# Patient Record
Sex: FEMALE | Race: WHITE | NOT HISPANIC OR LATINO | Employment: UNEMPLOYED | URBAN - METROPOLITAN AREA
[De-identification: names, ages, dates, MRNs, and addresses within clinical notes are randomized per-mention and may not be internally consistent; named-entity substitution may affect disease eponyms.]

---

## 2017-01-20 ENCOUNTER — TELEPHONE (OUTPATIENT)
Dept: MATERNAL FETAL MEDICINE | Facility: CLINIC | Age: 32
End: 2017-01-20

## 2017-01-20 ENCOUNTER — TELEPHONE (OUTPATIENT)
Dept: OBSTETRICS AND GYNECOLOGY | Facility: CLINIC | Age: 32
End: 2017-01-20

## 2017-01-20 DIAGNOSIS — Z36.89 ESTABLISH GESTATIONAL AGE, ULTRASOUND: Primary | ICD-10-CM

## 2017-01-20 DIAGNOSIS — O09.30 LATE PRENATAL CARE AFFECTING PREGNANCY: ICD-10-CM

## 2017-01-20 DIAGNOSIS — Z36.89 ENCOUNTER FOR FETAL ANATOMIC SURVEY: ICD-10-CM

## 2017-01-20 DIAGNOSIS — O09.32 LIMITED PRENATAL CARE IN SECOND TRIMESTER: ICD-10-CM

## 2017-01-20 NOTE — TELEPHONE ENCOUNTER
"Patient calling [facility] today to schedule her ultrasound. After some questioning and looking at her record, it does appear that patient has had very limited prenatal care. There was no dating ultrasound noted. Patient was last seen in September of 2016.    I asked patient if she was feeling fetal movement - per patient - "Yes I am feeling baby move. A lot."    Patient is scheduled to see Alejandra Fisher NP on 01/24/2017. Nurse confirmed that with her over the phone and then scheduled her for the next available appointment on Tuesday 02/07/2017 in [facility] for a Dating/Anatomy Scan.     She confirmed that the number listed in EPIC is her best contact number. Per patient, "I never had any missed calls from [facility] to schedule."    Message sent to Dr. Fuchs and Alejandra to keep all health care providers updated.    Patient verbalized understanding of all information received.  "

## 2017-01-20 NOTE — TELEPHONE ENCOUNTER
Thanks Mago for letting us know. Pt. No showed her appointments back in October and we have tried to call patient to get her rescheduled but was unable to get in touch with pt. Thanks for letting us know

## 2017-01-20 NOTE — TELEPHONE ENCOUNTER
"----- Message from Mago Garnica RN sent at 1/20/2017 11:13 AM CST -----  Hey Arlette Snyder and Ms. Alejandra Landry calling [facility] today to schedule her ultrasound. After some questioning and looking at her record, it does appear that patient has had very limited prenatal care. I did not even see a dating ultrasound.    I asked patient if she was feeling fetal movement - per patient - "Yes I am feeling baby move. A lot."    I see that she is scheduled to see Alejandra on 01/24/2017. I confirmed that with her over the phone and then scheduled her for our next available scan on Tuesday 02/07/2017 in [facility] for a Dating/Anatomy Scan.     I hope this is okay. She confirmed that the number listed in EPIC is her best contact number. Per patient, "I never had any missed calls from [facility] to schedule."    Anyway, just wanted to update you all and get everyone on the same page.    Thanks,  Mago Garnica, BSN, RN  [facility]    "

## 2017-01-24 ENCOUNTER — ROUTINE PRENATAL (OUTPATIENT)
Dept: OBSTETRICS AND GYNECOLOGY | Facility: CLINIC | Age: 32
End: 2017-01-24
Payer: MEDICAID

## 2017-01-24 VITALS
WEIGHT: 167.56 LBS | SYSTOLIC BLOOD PRESSURE: 100 MMHG | DIASTOLIC BLOOD PRESSURE: 72 MMHG | BODY MASS INDEX: 23.37 KG/M2

## 2017-01-24 DIAGNOSIS — R30.0 DYSURIA DURING PREGNANCY, THIRD TRIMESTER: ICD-10-CM

## 2017-01-24 DIAGNOSIS — O09.33 LIMITED PRENATAL CARE, THIRD TRIMESTER: ICD-10-CM

## 2017-01-24 DIAGNOSIS — O26.893 DYSURIA DURING PREGNANCY, THIRD TRIMESTER: ICD-10-CM

## 2017-01-24 DIAGNOSIS — Z34.80 SUPERVISION OF OTHER NORMAL PREGNANCY: Primary | ICD-10-CM

## 2017-01-24 LAB
AMPHET+METHAMPHET UR QL: NEGATIVE
BARBITURATES UR QL SCN>200 NG/ML: NEGATIVE
BENZODIAZ UR QL SCN>200 NG/ML: NEGATIVE
BZE UR QL SCN: NEGATIVE
CANNABINOIDS UR QL SCN: NORMAL
CREAT UR-MCNC: 85 MG/DL
ETHANOL UR-MCNC: <10 MG/DL
METHADONE UR QL SCN>300 NG/ML: NEGATIVE
OPIATES UR QL SCN: NEGATIVE
PCP UR QL SCN>25 NG/ML: NEGATIVE
TOXICOLOGY INFORMATION: NORMAL

## 2017-01-24 PROCEDURE — 87086 URINE CULTURE/COLONY COUNT: CPT

## 2017-01-24 PROCEDURE — 99999 PR PBB SHADOW E&M-EST. PATIENT-LVL III: CPT | Mod: PBBFAC,,, | Performed by: NURSE PRACTITIONER

## 2017-01-24 PROCEDURE — 99213 OFFICE O/P EST LOW 20 MIN: CPT | Mod: PBBFAC | Performed by: NURSE PRACTITIONER

## 2017-01-24 PROCEDURE — 87077 CULTURE AEROBIC IDENTIFY: CPT

## 2017-01-24 PROCEDURE — 87186 SC STD MICRODIL/AGAR DIL: CPT

## 2017-01-24 PROCEDURE — 80307 DRUG TEST PRSMV CHEM ANLYZR: CPT

## 2017-01-24 PROCEDURE — 99212 OFFICE O/P EST SF 10 MIN: CPT | Mod: TH,S$PBB,, | Performed by: NURSE PRACTITIONER

## 2017-01-24 PROCEDURE — 87088 URINE BACTERIA CULTURE: CPT

## 2017-01-24 RX ORDER — NITROFURANTOIN 25; 75 MG/1; MG/1
100 CAPSULE ORAL 2 TIMES DAILY
Qty: 14 CAPSULE | Refills: 0 | Status: SHIPPED | OUTPATIENT
Start: 2017-01-24 | End: 2017-01-31

## 2017-01-24 NOTE — PROGRESS NOTES
Here for routine OB appt at 29w5d.  This is her 2nd PN visit this pregnancy.  She has not yet had a dating US or anatomy scan.  Pt states she has not been seen because she was contemplating .  She is now considering adoption.  Reports she is down to 3 cigarettes per day.  Reports she is off Adderrall.   Per [facility] notes from family medicine doctor in 10/2016 and 2016 she reported having a miscarriage and was prescribed Adderall at both visits.   Pt is c/o dysuria, frequency, and back pain.  Denies fever.  Udip + Nitrates.  Urine culture pending.  Macrobid prescribed.  Reports good FM.  Denies LOF, denies VB, denies contractions.  Reviewed warning signs of Labor and Preeclampsia.  Daily FM counts reinforced.  Utox, OB glucose and CBC today.  Consult to  gem Nicole notified.  MFM scan scheduled for 17  F/U scheduled 2 weeks

## 2017-01-24 NOTE — MR AVS SNAPSHOT
[facility] [address]  [address]  [place] [address]  Phone: [phone]  Fax: [phone]                  Greer Landry   2017 9:00 AM   Routine Prenatal    Description:  Female : 1985   Provider:  Alejandra Fisher NP   Department:  [facility] [address]           Reason for Visit     Routine Prenatal Visit                To Do List           Future Appointments        Provider Department Dept Phone    2017 8:40 AM ULTRASOUND, [facility] 4TH FLR CLINIC [facility] [phone]      Goals (5 Years of Data)     None      [facility]     [facility] Line -  Assistance  Registered nurses in the [facility] provide clinical advisement, health education, appointment booking, and other advisory services.  Call for this free service at [phone].             Medications           Message regarding Medications     Verify the changes and/or additions to your medication regime listed below are the same as discussed with your clinician today.  If any of these changes or additions are incorrect, please notify your healthcare provider.             Verify that the below list of medications is an accurate representation of the medications you are currently taking.  If none reported, the list may be blank. If incorrect, please contact your healthcare provider. Carry this list with you in case of emergency.           Current Medications     dextroamphetamine-amphetamine (ADDERALL) 20 mg tablet Take 1 tablet by mouth 2 (two) times daily.    dextroamphetamine-amphetamine (ADDERALL) 20 mg tablet Take 1 tablet by mouth 2 (two) times daily.    ondansetron (ZOFRAN, AS HYDROCHLORIDE,) 8 MG tablet Take 1 tablet (8 mg total) by mouth every 12 (twelve) hours as needed for Nausea.    PNV no.106-iron-folate #6-dha (OB COMPLETE GOLD) 27.5 mg iron- 1 mg Cap Take 27.5 mg by mouth once daily.           Clinical Reference Information            Prenatal Vitals     Enc. Date GA Prenatal Vitals Prenatal Pulse Pain Level Urine Albumin/Glucose Edema Presentation Dilation/Effacement/Station    1/24/17 29w5d 100/72 / 76 kg (167 lb 8.8 oz)   0 Trace / Negative       9/23/16 12w1d 102/64 / 71.1 kg (156 lb 12 oz)   0 Negative / Negative         Vital Signs - Last Recorded  Most recent update: 1/24/2017  9:28 AM by Jessenia Palacios MA    BP Wt LMP BMI       100/72 76 kg (167 lb 8.8 oz) 06/30/2016 23.37 kg/m2       Allergies as of 1/24/2017     No Known Allergies      Immunizations Administered on Date of Encounter - 1/24/2017     None      Smoking Cessation     If you would like to quit smoking:   You may be eligible for free services if you are a [place] resident and started smoking cigarettes before September 1, 1988.  Call the Smoking Cessation Trust (SCT) toll free at (024) 839-3784 or (325) 092-6903.   Call 1-286-QUIT-NOW if you do not meet the above criteria.

## 2017-01-25 ENCOUNTER — TELEPHONE (OUTPATIENT)
Dept: OBSTETRICS AND GYNECOLOGY | Facility: CLINIC | Age: 32
End: 2017-01-25

## 2017-01-25 NOTE — TELEPHONE ENCOUNTER
Called patient and notified her that her urine was positive for THC and that she needed to remain abstinent of drug use during pregnancy.

## 2017-01-26 LAB — BACTERIA UR CULT: NORMAL

## 2017-02-07 ENCOUNTER — SOCIAL WORK (OUTPATIENT)
Dept: CASE MANAGEMENT | Facility: OTHER | Age: 32
End: 2017-02-07
Payer: MEDICAID

## 2017-02-07 ENCOUNTER — OFFICE VISIT (OUTPATIENT)
Dept: MATERNAL FETAL MEDICINE | Facility: CLINIC | Age: 32
End: 2017-02-07
Attending: OBSTETRICS & GYNECOLOGY
Payer: MEDICAID

## 2017-02-07 ENCOUNTER — ROUTINE PRENATAL (OUTPATIENT)
Dept: OBSTETRICS AND GYNECOLOGY | Facility: CLINIC | Age: 32
End: 2017-02-07
Payer: MEDICAID

## 2017-02-07 VITALS — BODY MASS INDEX: 23.15 KG/M2 | DIASTOLIC BLOOD PRESSURE: 70 MMHG | SYSTOLIC BLOOD PRESSURE: 100 MMHG | WEIGHT: 166 LBS

## 2017-02-07 DIAGNOSIS — Z36.89 ESTABLISH GESTATIONAL AGE, ULTRASOUND: ICD-10-CM

## 2017-02-07 DIAGNOSIS — O09.32 LIMITED PRENATAL CARE IN SECOND TRIMESTER: ICD-10-CM

## 2017-02-07 DIAGNOSIS — Z36.89 ENCOUNTER FOR FETAL ANATOMIC SURVEY: ICD-10-CM

## 2017-02-07 DIAGNOSIS — O09.33 LIMITED PRENATAL CARE IN THIRD TRIMESTER: Primary | ICD-10-CM

## 2017-02-07 DIAGNOSIS — G43.909 MIGRAINE WITHOUT STATUS MIGRAINOSUS, NOT INTRACTABLE, UNSPECIFIED MIGRAINE TYPE: ICD-10-CM

## 2017-02-07 DIAGNOSIS — F98.8 ADD (ATTENTION DEFICIT DISORDER): ICD-10-CM

## 2017-02-07 DIAGNOSIS — Z34.93 NORMAL PREGNANCY IN THIRD TRIMESTER: ICD-10-CM

## 2017-02-07 PROCEDURE — 99212 OFFICE O/P EST SF 10 MIN: CPT | Mod: PBBFAC,25 | Performed by: OBSTETRICS & GYNECOLOGY

## 2017-02-07 PROCEDURE — 99213 OFFICE O/P EST LOW 20 MIN: CPT | Mod: TH,S$PBB,, | Performed by: OBSTETRICS & GYNECOLOGY

## 2017-02-07 PROCEDURE — 99499 UNLISTED E&M SERVICE: CPT | Mod: S$PBB,,, | Performed by: PEDIATRICS

## 2017-02-07 PROCEDURE — 76811 OB US DETAILED SNGL FETUS: CPT | Mod: 26,S$PBB,, | Performed by: PEDIATRICS

## 2017-02-07 PROCEDURE — 99999 PR PBB SHADOW E&M-EST. PATIENT-LVL II: CPT | Mod: PBBFAC,,, | Performed by: OBSTETRICS & GYNECOLOGY

## 2017-02-07 RX ORDER — BUTALBITAL, ACETAMINOPHEN AND CAFFEINE 50; 325; 40 MG/1; MG/1; MG/1
1 TABLET ORAL EVERY 4 HOURS PRN
Qty: 30 TABLET | Refills: 0 | Status: SHIPPED | OUTPATIENT
Start: 2017-02-07 | End: 2017-03-09 | Stop reason: SDUPTHER

## 2017-02-07 NOTE — LETTER
February 7, 2017      Lila Fuchs MD  [address]  [address]  [place] [address]           [facility]  [address]  [place] [address]  Phone: [phone]          Patient: Greer Landry   MR Number: [id number]   YOB: 1985   Date of Visit: 2/7/2017       Dear Dr. Lila Fuchs:    Thank you for referring Greer Landry to me for evaluation. Attached you will find relevant portions of my assessment and plan of care.    If you have questions, please do not hesitate to call me. I look forward to following Greer Landry along with you.    Sincerely,    Michelle Camacho MD    Enclosure  CC:  No Recipients    If you would like to receive this communication electronically, please contact [email or web address] or [phone] to request more information on [facility] access.    For providers and/or their staff who would like to refer a patient to [facility], please contact us through our one-stop-shop provider referral line, [facility], at [phone].    If you feel you have received this communication in error or would no longer like to receive these types of communications, please e-mail [email or web address]

## 2017-02-07 NOTE — PROGRESS NOTES
Positive fetal movement.  No contractions, vaginal bleeding, or loss of fluid.    Late transfer of care.  Patient's partner is in retirement and she is unsure if she plans on keeping the baby or putting up for adoption.  Will meet with Tammy today.    Migraines-- patient reports using an entire bottle of extra strength tylenol.  Will prescribe fioricet today.  TDAP and glucose screen today.   Defer measurement and FHT as patient had U/S today.

## 2017-02-08 NOTE — PROGRESS NOTES
Indication: dating/ anatomy (Dr. GOLDIE YEUNG).   Maternal age (31 years).   No screenings.   ____________________________________________________________________________  History: Age: 31 years. LMP not sure.  ____________________________________________________________________________  Dating:  LMP: 06/30/16 EDC: 04/06/17 GA by LMP: 31w5d  Current Scan on: 02/07/17 EDC: 03/18/17 GA by current scan: 34w3d  Best Overall Assessment: 02/07/17 EDC: 03/18/17 Assessed GA: 34w3d  The calculation of the gestational age by current scan was based on BPD, OFD, HC, AC, FL and HUM.  The Best Overall Assessment is based on the ultrasound examination on 02/07/17.  ____________________________________________________________________________  General Evaluation:  Fetal heart activity: present. Fetal heart rate: 140 bpm.   Presentation: cephalic.   Fetal movement: visible.   Amniotic Fluid: normal. Maximal vertical pocket 5.9 cm.   Cord: 3 vessels.   Placenta: anterior.     ____________________________________________________________________________  Anatomy Scan:    Reyes gestation.    Biometry:  BPD 86.2 mm 60th% 34w5d (33w5d to 35w6d)  .6 mm 36th% 35w2d (32w2d to 38w2d)  .4 mm >95th% 36w3d (35w3d to 37w3d)  FL 63.3 mm 8th% 32w5d (29w5d to 35w5d)  .3 mm 48th% 34w2d  HUM 56.7 mm 28th% 33w2d  CM 2.6 mm <5th%  EFW (lbs/oz) 5 lbs 12 ozs  EFW (g) 2619 g  67th%       Fetal Anatomy:    Visualized with normal appearance: head, chest, gastrointestinal tract, kidneys, bladder.  Not examined: neck, skin.  Brain: sub-opt due to position and gestational age.  Face: profile sub-opt, nose normal, lip normal.  Spine: Sub-optimal  Heart: Visualized and normal appearance: four-chamber view, right outflow tract, inferior vena cava, superior vena cava. Not visible: Ductal arch.   Angle: to the fetal left. Four-chamber view: septum appears intact. Left outflow tract: sub-opt. Aortic arch: Normal.  Abdominal Wall:  Sub-optimal.   Genitalia: Female fetus.   Extremities: 4 limbs. Left plantar foot subopt, rt plantar foot wnl, open hands bilaterally, right arm subopt.    Summary of Ultrasound Findings:    Transabdominal US. U/S machine: Saisei E10.     Impression:     Fetal growth is appropriate. Suboptimal anatomical views due to late gestational age.    ____________________________________________________________________________  Report Summary:    Impression:     Patient shows for dating and anatomy at late gestational age.  She does not really know her LMP.  Some views are suboptimal secondary to fetal positioning and decreased resolution.  No obvious abnormalities are detected.     Biometry is consistent with other parameters except AC which is still greater 95th percentile.    Normal amniotic fluid volume per qualitative assessment.    Normal placental location without evidence of previa.  Normal appearing cervical length per trans-abdominal screening.     Recommendations:     Suggest repeat scan as you feel clinically indicated.     Thank you for allowing us to participate in the care of your patients.  If you have any questions concerning today's consultation, please feel free to contact me or one of my partners.  We can be reached at [phone] during normal business hours.  If you have a question after normal business hours, please contact Labor and Delivery [phone] and the unit secretary will page our on call physician.

## 2017-02-08 NOTE — PROGRESS NOTES
Met with pt in the clinic today to discuss adoption. Pt is unsure about adoption at this time. Provided education and contact info for adoption agencies. Emotional support provided. Also discussed pt previous +THC utox and discussed Adderall use during pregnancy. Educated pt about mandated reporting for drug exposed newborns. Pt verbalized understanding.       Tammy Nicole LCSW    [facility]  [email or web address]    (phone) [phone] or  [place]. [phone]  (fax) [phone]

## 2017-03-09 ENCOUNTER — ROUTINE PRENATAL (OUTPATIENT)
Dept: OBSTETRICS AND GYNECOLOGY | Facility: CLINIC | Age: 32
End: 2017-03-09
Payer: MEDICAID

## 2017-03-09 VITALS
BODY MASS INDEX: 25.15 KG/M2 | WEIGHT: 180.31 LBS | DIASTOLIC BLOOD PRESSURE: 70 MMHG | SYSTOLIC BLOOD PRESSURE: 102 MMHG

## 2017-03-09 DIAGNOSIS — Z34.93 NORMAL PREGNANCY IN THIRD TRIMESTER: Primary | ICD-10-CM

## 2017-03-09 DIAGNOSIS — G43.909 MIGRAINE WITHOUT STATUS MIGRAINOSUS, NOT INTRACTABLE, UNSPECIFIED MIGRAINE TYPE: ICD-10-CM

## 2017-03-09 PROCEDURE — 99212 OFFICE O/P EST SF 10 MIN: CPT | Mod: PBBFAC | Performed by: OBSTETRICS & GYNECOLOGY

## 2017-03-09 PROCEDURE — 87081 CULTURE SCREEN ONLY: CPT

## 2017-03-09 PROCEDURE — 99213 OFFICE O/P EST LOW 20 MIN: CPT | Mod: TH,S$PBB,, | Performed by: OBSTETRICS & GYNECOLOGY

## 2017-03-09 PROCEDURE — 99999 PR PBB SHADOW E&M-EST. PATIENT-LVL II: CPT | Mod: PBBFAC,,, | Performed by: OBSTETRICS & GYNECOLOGY

## 2017-03-09 RX ORDER — BUTALBITAL, ACETAMINOPHEN AND CAFFEINE 50; 325; 40 MG/1; MG/1; MG/1
1 TABLET ORAL EVERY 4 HOURS PRN
Qty: 30 TABLET | Refills: 0 | Status: SHIPPED | OUTPATIENT
Start: 2017-03-09 | End: 2017-04-08

## 2017-03-09 NOTE — PROGRESS NOTES
Good fetal movement.  Regular contractions- 4 per hour, increasing vaginal pressure, no vaginal bleeding, and no loss of fluid. Patient is trying not to take adderall.  Only occasionally.  Last use was one week ago.  Not using THC anymore.  Using fioricet for migraines.  Migraines resolve with medication.  Negative pre-e ROS.    Did not get glucose/cbc.  Will do today. Also, HIV, RPR, GBS.    Fioricet refilled.   Does plan on giving baby up for adoption.  Still needs to call [employer] to arrange.

## 2017-03-11 LAB — BACTERIA SPEC AEROBE CULT: NORMAL

## 2017-03-14 ENCOUNTER — TELEPHONE (OUTPATIENT)
Dept: OBSTETRICS AND GYNECOLOGY | Facility: CLINIC | Age: 32
End: 2017-03-14

## 2017-03-14 NOTE — TELEPHONE ENCOUNTER
----- Message from Carmella Ellsworth sent at 3/14/2017  9:42 AM CDT -----  Contact: Patient  \X _1st Request  _  2nd Request  _  3rd Request    Who:PAULETTE JONAS [[id number]]    Why:Gisell is 37 weeks and she missed her appointment on today and needs to have it reschedule for this Wednesday he wants a call back from the nurse     What Number to Call Back:Patient can be reached at [phone]    When to Expect a call back: (Before the end of the day)   -- if call after 3:00 call back will be tomorrow.

## 2017-03-16 ENCOUNTER — TELEPHONE (OUTPATIENT)
Dept: OBSTETRICS AND GYNECOLOGY | Facility: CLINIC | Age: 32
End: 2017-03-16

## 2017-03-16 NOTE — TELEPHONE ENCOUNTER
----- Message from Thalia Smith sent at 3/16/2017  3:00 PM CDT -----  Contact: PAULETTE JONAS [[id number]]  x_  1st Request  _  2nd Request  _  3rd Request        Who: PAULETTE JONAS [[id number]]    Why: patient states she would like a call back in regards to test results and her routine ob appt     What Number to Call Back: [phone]    When to Expect a call back: (Before the end of the day)   -- if call after 3:00 call back will be tomorrow.

## 2017-03-21 ENCOUNTER — HOSPITAL ENCOUNTER (INPATIENT)
Facility: HOSPITAL | Age: 32
LOS: 1 days | Discharge: HOME | End: 2017-03-22
Attending: OBSTETRICS & GYNECOLOGY | Admitting: OBSTETRICS & GYNECOLOGY
Payer: MEDICAID

## 2017-03-21 DIAGNOSIS — Z37.9 NORMAL LABOR: Primary | ICD-10-CM

## 2017-03-21 DIAGNOSIS — O26.893 ABDOMINAL PAIN IN PREGNANCY, THIRD TRIMESTER: ICD-10-CM

## 2017-03-21 DIAGNOSIS — R10.9 ABDOMINAL PAIN IN PREGNANCY, THIRD TRIMESTER: ICD-10-CM

## 2017-03-21 PROBLEM — O26.899 ABDOMINAL PAIN IN PREGNANCY: Status: ACTIVE | Noted: 2017-03-21

## 2017-03-21 PROBLEM — Z3A.40 40 WEEKS GESTATION OF PREGNANCY: Status: ACTIVE | Noted: 2017-03-21

## 2017-03-21 LAB
ABO GROUP BLD: NORMAL
AMPHET+METHAMPHET UR QL: NORMAL
BARBITURATES UR QL SCN>200 NG/ML: NEGATIVE
BASOPHILS # BLD AUTO: 0.02 K/UL
BASOPHILS NFR BLD: 0.1 %
BENZODIAZ UR QL SCN>200 NG/ML: NEGATIVE
BLD GP AB SCN CELLS X3 SERPL QL: NORMAL
BZE UR QL SCN: NEGATIVE
CANNABINOIDS UR QL SCN: NEGATIVE
CREAT UR-MCNC: 36 MG/DL
DIFFERENTIAL METHOD: ABNORMAL
EOSINOPHIL # BLD AUTO: 0.1 K/UL
EOSINOPHIL NFR BLD: 0.4 %
ERYTHROCYTE [DISTWIDTH] IN BLOOD BY AUTOMATED COUNT: 13.7 %
HCT VFR BLD AUTO: 30.1 %
HGB BLD-MCNC: 9.9 G/DL
LYMPHOCYTES # BLD AUTO: 2.8 K/UL
LYMPHOCYTES NFR BLD: 18 %
MCH RBC QN AUTO: 25.2 PG
MCHC RBC AUTO-ENTMCNC: 32.9 %
MCV RBC AUTO: 77 FL
METHADONE UR QL SCN>300 NG/ML: NEGATIVE
MONOCYTES # BLD AUTO: 0.8 K/UL
MONOCYTES NFR BLD: 5.2 %
NEUTROPHILS # BLD AUTO: 11.7 K/UL
NEUTROPHILS NFR BLD: 76 %
OPIATES UR QL SCN: NEGATIVE
PCP UR QL SCN>25 NG/ML: NEGATIVE
PLATELET # BLD AUTO: 376 K/UL
PMV BLD AUTO: 10.6 FL
RBC # BLD AUTO: 3.93 M/UL
RH BLD: NORMAL
TOXICOLOGY INFORMATION: NORMAL
WBC # BLD AUTO: 15.44 K/UL

## 2017-03-21 PROCEDURE — 85025 COMPLETE CBC W/AUTO DIFF WBC: CPT

## 2017-03-21 PROCEDURE — 10907ZC DRAINAGE OF AMNIOTIC FLUID, THERAPEUTIC FROM PRODUCTS OF CONCEPTION, VIA NATURAL OR ARTIFICIAL OPENING: ICD-10-PCS | Performed by: OBSTETRICS & GYNECOLOGY

## 2017-03-21 PROCEDURE — 59409 OBSTETRICAL CARE: CPT | Mod: GB,,, | Performed by: OBSTETRICS & GYNECOLOGY

## 2017-03-21 PROCEDURE — 82570 ASSAY OF URINE CREATININE: CPT

## 2017-03-21 PROCEDURE — 63600175 PHARM REV CODE 636 W HCPCS: Performed by: OBSTETRICS & GYNECOLOGY

## 2017-03-21 PROCEDURE — 86850 RBC ANTIBODY SCREEN: CPT

## 2017-03-21 PROCEDURE — 25000003 PHARM REV CODE 250: Performed by: OBSTETRICS & GYNECOLOGY

## 2017-03-21 PROCEDURE — 36415 COLL VENOUS BLD VENIPUNCTURE: CPT

## 2017-03-21 PROCEDURE — 72100002 HC LABOR CARE, 1ST 8 HOURS

## 2017-03-21 PROCEDURE — 86900 BLOOD TYPING SEROLOGIC ABO: CPT

## 2017-03-21 PROCEDURE — 25000003 PHARM REV CODE 250

## 2017-03-21 PROCEDURE — 86901 BLOOD TYPING SEROLOGIC RH(D): CPT

## 2017-03-21 PROCEDURE — 11000001 HC ACUTE MED/SURG PRIVATE ROOM

## 2017-03-21 PROCEDURE — 72200004 HC VAGINAL DELIVERY LEVEL I

## 2017-03-21 RX ORDER — CARBOPROST TROMETHAMINE 250 UG/ML
250 INJECTION, SOLUTION INTRAMUSCULAR
Status: DISCONTINUED | OUTPATIENT
Start: 2017-03-21 | End: 2017-03-22 | Stop reason: HOSPADM

## 2017-03-21 RX ORDER — DIPHENHYDRAMINE HYDROCHLORIDE 50 MG/ML
25 INJECTION INTRAMUSCULAR; INTRAVENOUS EVERY 4 HOURS PRN
Status: DISCONTINUED | OUTPATIENT
Start: 2017-03-21 | End: 2017-03-21

## 2017-03-21 RX ORDER — ACETAMINOPHEN 325 MG/1
650 TABLET ORAL EVERY 6 HOURS PRN
Status: DISCONTINUED | OUTPATIENT
Start: 2017-03-21 | End: 2017-03-22 | Stop reason: HOSPADM

## 2017-03-21 RX ORDER — BUTORPHANOL TARTRATE 1 MG/ML
2 INJECTION INTRAMUSCULAR; INTRAVENOUS ONCE
Status: COMPLETED | OUTPATIENT
Start: 2017-03-21 | End: 2017-03-21

## 2017-03-21 RX ORDER — ONDANSETRON 8 MG/1
8 TABLET, ORALLY DISINTEGRATING ORAL EVERY 8 HOURS PRN
Status: DISCONTINUED | OUTPATIENT
Start: 2017-03-21 | End: 2017-03-22 | Stop reason: HOSPADM

## 2017-03-21 RX ORDER — OXYCODONE AND ACETAMINOPHEN 10; 325 MG/1; MG/1
1 TABLET ORAL EVERY 4 HOURS PRN
Status: DISCONTINUED | OUTPATIENT
Start: 2017-03-21 | End: 2017-03-22 | Stop reason: HOSPADM

## 2017-03-21 RX ORDER — MISOPROSTOL 200 UG/1
600 TABLET ORAL
Status: DISCONTINUED | OUTPATIENT
Start: 2017-03-21 | End: 2017-03-22 | Stop reason: HOSPADM

## 2017-03-21 RX ORDER — DOCUSATE SODIUM 100 MG/1
200 CAPSULE, LIQUID FILLED ORAL 2 TIMES DAILY PRN
Status: DISCONTINUED | OUTPATIENT
Start: 2017-03-21 | End: 2017-03-22 | Stop reason: HOSPADM

## 2017-03-21 RX ORDER — OXYTOCIN/RINGER'S LACTATE 20/1000 ML
41.65 PLASTIC BAG, INJECTION (ML) INTRAVENOUS CONTINUOUS
Status: DISPENSED | OUTPATIENT
Start: 2017-03-21 | End: 2017-03-21

## 2017-03-21 RX ORDER — DIPHENHYDRAMINE HCL 25 MG
25 CAPSULE ORAL EVERY 4 HOURS PRN
Status: DISCONTINUED | OUTPATIENT
Start: 2017-03-21 | End: 2017-03-22 | Stop reason: HOSPADM

## 2017-03-21 RX ORDER — SODIUM CHLORIDE, SODIUM LACTATE, POTASSIUM CHLORIDE, CALCIUM CHLORIDE 600; 310; 30; 20 MG/100ML; MG/100ML; MG/100ML; MG/100ML
INJECTION, SOLUTION INTRAVENOUS CONTINUOUS
Status: DISCONTINUED | OUTPATIENT
Start: 2017-03-21 | End: 2017-03-22 | Stop reason: HOSPADM

## 2017-03-21 RX ORDER — HYDROCORTISONE 25 MG/G
CREAM TOPICAL 3 TIMES DAILY PRN
Status: DISCONTINUED | OUTPATIENT
Start: 2017-03-21 | End: 2017-03-22 | Stop reason: HOSPADM

## 2017-03-21 RX ORDER — METHYLERGONOVINE MALEATE 0.2 MG/ML
200 INJECTION INTRAVENOUS
Status: DISCONTINUED | OUTPATIENT
Start: 2017-03-21 | End: 2017-03-22 | Stop reason: HOSPADM

## 2017-03-21 RX ORDER — OXYTOCIN/RINGER'S LACTATE 20/1000 ML
PLASTIC BAG, INJECTION (ML) INTRAVENOUS
Status: COMPLETED
Start: 2017-03-21 | End: 2017-03-21

## 2017-03-21 RX ORDER — LABETALOL 200 MG/1
200 TABLET, FILM COATED ORAL EVERY 12 HOURS
Status: DISCONTINUED | OUTPATIENT
Start: 2017-03-21 | End: 2017-03-22 | Stop reason: HOSPADM

## 2017-03-21 RX ORDER — METHYLPHENIDATE HYDROCHLORIDE 5 MG/1
20 TABLET ORAL 2 TIMES DAILY WITH MEALS
Status: DISCONTINUED | OUTPATIENT
Start: 2017-03-21 | End: 2017-03-22 | Stop reason: HOSPADM

## 2017-03-21 RX ORDER — IBUPROFEN 600 MG/1
600 TABLET ORAL EVERY 6 HOURS PRN
Status: DISCONTINUED | OUTPATIENT
Start: 2017-03-21 | End: 2017-03-22 | Stop reason: HOSPADM

## 2017-03-21 RX ORDER — OXYTOCIN/RINGER'S LACTATE 20/1000 ML
41.65 PLASTIC BAG, INJECTION (ML) INTRAVENOUS CONTINUOUS
Status: DISCONTINUED | OUTPATIENT
Start: 2017-03-21 | End: 2017-03-21

## 2017-03-21 RX ORDER — OXYCODONE AND ACETAMINOPHEN 5; 325 MG/1; MG/1
1 TABLET ORAL EVERY 4 HOURS PRN
Status: DISCONTINUED | OUTPATIENT
Start: 2017-03-21 | End: 2017-03-22 | Stop reason: HOSPADM

## 2017-03-21 RX ORDER — ONDANSETRON 8 MG/1
8 TABLET, ORALLY DISINTEGRATING ORAL EVERY 8 HOURS PRN
Status: DISCONTINUED | OUTPATIENT
Start: 2017-03-21 | End: 2017-03-21

## 2017-03-21 RX ADMIN — OXYCODONE HYDROCHLORIDE AND ACETAMINOPHEN 1 TABLET: 10; 325 TABLET ORAL at 07:03

## 2017-03-21 RX ADMIN — LABETALOL HYDROCHLORIDE 200 MG: 200 TABLET, FILM COATED ORAL at 08:03

## 2017-03-21 RX ADMIN — OXYCODONE HYDROCHLORIDE AND ACETAMINOPHEN 1 TABLET: 10; 325 TABLET ORAL at 02:03

## 2017-03-21 RX ADMIN — BUTORPHANOL TARTRATE 2 MG: 1 INJECTION, SOLUTION INTRAMUSCULAR; INTRAVENOUS at 10:03

## 2017-03-21 RX ADMIN — Medication 20 UNITS: at 09:03

## 2017-03-21 RX ADMIN — IBUPROFEN 600 MG: 600 TABLET ORAL at 06:03

## 2017-03-21 RX ADMIN — LABETALOL HYDROCHLORIDE 200 MG: 200 TABLET, FILM COATED ORAL at 12:03

## 2017-03-21 RX ADMIN — PROMETHAZINE HYDROCHLORIDE 25 MG: 25 INJECTION INTRAMUSCULAR; INTRAVENOUS at 10:03

## 2017-03-21 RX ADMIN — METHYLPHENIDATE HYDROCHLORIDE 20 MG: 5 TABLET ORAL at 06:03

## 2017-03-21 NOTE — IP AVS SNAPSHOT
[facility]  [address]  [place] [address]  Phone: [phone]           Patient Discharge Instructions     Our goal is to set you up for success. This packet includes information on your condition, medications, and your home care. It will help you to care for yourself so you don't get sicker and need to go back to the hospital.     Please ask your nurse if you have any questions.        There are many details to remember when preparing to leave the hospital. Here is what you will need to do:    1. Take your medicine. If you are prescribed medications, review your Medication List in the following pages. You may have new medications to  at the pharmacy and others that you'll need to stop taking. Review the instructions for how and when to take your medications. Talk with your doctor or nurses if you are unsure of what to do.     2. Go to your follow-up appointments. Specific follow-up information is listed in the following pages. Your may be contacted by a transition nurse or clinical provider about future appointments. Be sure we have all of the phone numbers to reach you, if needed. Please contact your provider's office if you are unable to make an appointment.     3. Watch for warning signs. Your doctor or nurse will give you detailed warning signs to watch for and when to call for assistance. These instructions may also include educational information about your condition. If you experience any of warning signs to your health, call your doctor.               [facility]  Unless otherwise directed by your provider, please contact [facility], our nurse care line that is available for 24/7 assistance.     [phone] (toll-free)    Registered nurses in the [facility] provide clinical advisement, health education, appointment booking, and other advisory services.                    ** Verify the list of medication(s) below is accurate and up to date. Carry this  with you in case of emergency. If your medications have changed, please notify your healthcare provider.             Medication List      START taking these medications        Additional Info                      docusate sodium 100 MG capsule   Commonly known as:  COLACE   Quantity:  60 capsule   Refills:  0   Dose:  200 mg    Instructions:  Take 2 capsules (200 mg total) by mouth 2 (two) times daily as needed for Constipation.     Begin Date    AM    Noon    PM    Bedtime       ferrous sulfate 325 mg (65 mg iron) Tab tablet   Quantity:  30 tablet   Refills:  3   Dose:  325 mg    Instructions:  Take 1 tablet (325 mg total) by mouth 2 (two) times daily.     Begin Date    AM    Noon    PM    Bedtime       ibuprofen 600 MG tablet   Commonly known as:  ADVIL,MOTRIN   Quantity:  30 tablet   Refills:  0   Dose:  600 mg    Last time this was given:  600 mg on 3/21/2017  6:07 PM   Instructions:  Take 1 tablet (600 mg total) by mouth every 6 (six) hours as needed (cramping).     Begin Date    AM    Noon    PM    Bedtime       labetalol 200 MG tablet   Commonly known as:  NORMODYNE   Quantity:  60 tablet   Refills:  3   Dose:  200 mg    Last time this was given:  200 mg on 3/22/2017  9:15 AM   Instructions:  Take 1 tablet (200 mg total) by mouth every 12 (twelve) hours.     Begin Date    AM    Noon    PM    Bedtime       oxycodone-acetaminophen 5-325 mg per tablet   Commonly known as:  PERCOCET   Quantity:  30 tablet   Refills:  0   Dose:  1 tablet    Instructions:  Take 1 tablet by mouth every 8 (eight) hours as needed.     Begin Date    AM    Noon    PM    Bedtime         CONTINUE taking these medications        Additional Info                      butalbital-acetaminophen-caffeine -40 mg -40 mg per tablet   Commonly known as:  FIORICET, ESGIC   Quantity:  30 tablet   Refills:  0   Dose:  1 tablet    Instructions:  Take 1 tablet by mouth every 4 (four) hours as needed for Pain.     Begin Date    AM    Noon     PM    Bedtime       * dextroamphetamine-amphetamine 20 mg tablet   Commonly known as:  ADDERALL   Quantity:  60 tablet   Refills:  0   Dose:  1 tablet    Instructions:  Take 1 tablet by mouth 2 (two) times daily.     Begin Date    AM    Noon    PM    Bedtime       * dextroamphetamine-amphetamine 20 mg tablet   Commonly known as:  ADDERALL   Quantity:  14 tablet   Refills:  0   Dose:  1 tablet    Instructions:  Take 1 tablet by mouth 2 (two) times daily.     Begin Date    AM    Noon    PM    Bedtime       PNV no.106-iron-folate #6-dha 27.5 mg iron- 1 mg Cap   Commonly known as:  OB COMPLETE GOLD   Quantity:  30 capsule   Refills:  11   Dose:  27.5 mg    Instructions:  Take 27.5 mg by mouth once daily.     Begin Date    AM    Noon    PM    Bedtime       * Notice:  This list has 2 medication(s) that are the same as other medications prescribed for you. Read the directions carefully, and ask your doctor or other care provider to review them with you.      STOP taking these medications     ondansetron 8 MG tablet   Commonly known as:  ZOFRAN (AS HYDROCHLORIDE)            Where to Get Your Medications      You can get these medications from any pharmacy     Bring a paper prescription for each of these medications     dextroamphetamine-amphetamine 20 mg tablet    docusate sodium 100 MG capsule    ferrous sulfate 325 mg (65 mg iron) Tab tablet    ibuprofen 600 MG tablet    labetalol 200 MG tablet    oxycodone-acetaminophen 5-325 mg per tablet                  Please bring to all follow up appointments:    1. A copy of your discharge instructions.  2. All medicines you are currently taking in their original bottles.  3. Identification and insurance card.    Please arrive 15 minutes ahead of scheduled appointment time.    Please call 24 hours in advance if you must reschedule your appointment and/or time.        Your Scheduled Appointments     Mar 30, 2017  2:30 PM CDT   Routine Prenatal Visit with Bhavya Espinoza MD  "  [facility] [address] ([facility])    [address]  [address]  [place] [address] [phone]              Follow-up Information     Schedule an appointment as soon as possible for a visit in 1 week to follow up.    Why:  Blood Pressure Check        Schedule an appointment as soon as possible for a visit in 6 weeks to follow up.    Why:  Postpartum Visit        Follow up with Larisa Alva MD In 4 weeks.    Specialties:  Obstetrics, Obstetrics and Gynecology    Contact information:    [address]  [address]  [facility] [id number] [phone]          Discharge Instructions     Future Orders    Activity as tolerated     Call MD for:  difficulty breathing or increased cough     Call MD for:  increased confusion or weakness     Call MD for:  persistent dizziness, light-headedness, or visual disturbances     Call MD for:  persistent nausea and vomiting or diarrhea     Call MD for:  severe persistent headache     Call MD for:  severe uncontrolled pain     Call MD for:  temperature >100.4     Call MD for:  worsening rash     Diet general     Questions:    Total calories:      Fat restriction, if any:      Protein restriction, if any:      Na restriction, if any:      Fluid restriction:      Additional restrictions:          Primary Diagnosis     Your primary diagnosis was:  Active Labor      Admission Information     Date & Time Provider Department CSN    3/21/2017  9:19 AM Larisa Alva MD [facility] [id number]      Care Providers     Provider Role Specialty Primary office phone    Larisa Alva MD Attending Provider Obstetrics [phone]      Your Vitals Were     BP Pulse Temp Resp Height Weight    135/87 (BP Location: Left arm, Patient Position: Lying, BP Method: Automatic) 76 98.3 °F (36.8 °C) (Oral) 18 5' 11" (1.803 m) 80.7 kg (178 lb)    Last Period SpO2 BMI          06/30/2016 98% 24.83 kg/m2        Recent Lab Values     No lab values to display.      Allergies as of " 3/22/2017     No Known Allergies      Advance Directives     An advance directive is a document which, in the event you are no longer able to make decisions for yourself, tells your healthcare team what kind of treatment you do or do not want to receive, or who you would like to make those decisions for you.  If you do not currently have an advance directive, [facility] encourages you to create one.  For more information call:  [phone]), [phone] ([phone]),  or log on to [email or web address].        Smoking Cessation     If you would like to quit smoking:   You may be eligible for free services if you are a [place] resident and started smoking cigarettes before September 1, 1988.  Call the Smoking Cessation Trust (SCT) toll free at (720) 472-7352 or (698) 591-1447.   Call 4-705-QUIT-NOW if you do not meet the above criteria.            Language Assistance Services     ATTENTION: Language assistance services are available, free of charge. Please call [phone].      ATENCIÓN: Si habla español, tiene a canela disposición servicios gratuitos de asistencia lingüística. Llame al [phone].     CHÚ Ý: N?u b?n nói Ti?ng Vi?t, có các d?ch v? h? tr? ngôn ng? mi?n phí dành cho b?n. G?i s? [phone].         [facility] complies with applicable Federal civil rights laws and does not discriminate on the basis of race, color, national origin, age, disability, or sex.

## 2017-03-21 NOTE — H&P
"     HISTORY AND PHYSICAL       OBSTETRICS          Subjective:       Greer Landry is a 31 y.o.  female with IUP at 40w3d weeks gestation by 34 week U/S who presents in labor. States contractions started 2 hours ago. This IUP is complicated by late PNC, amphetamine and THC use this IUP, BUFA, RNI, tobacco use.  Patient reports contractions, denies vaginal bleeding, denies LOF.   Fetal Movement: normal.     PMHx:   Past Medical History:   Diagnosis Date    ADD (attention deficit disorder)        PSHx: No past surgical history on file.    All: Review of patient's allergies indicates:  No Known Allergies    SH:   Social History     Social History    Marital status: Single     Spouse name: N/A    Number of children: N/A    Years of education: N/A     Occupational History    Not on file.     Social History Main Topics    Smoking status: Current Every Day Smoker    Smokeless tobacco: Not on file    Alcohol use No    Drug use: Yes     Special: Marijuana    Sexual activity: Yes     Other Topics Concern    Not on file     Social History Narrative       FH:   Family History   Problem Relation Age of Onset    Cancer Paternal Grandfather     No Known Problems Paternal Grandmother     No Known Problems Maternal Grandmother     No Known Problems Maternal Grandfather     No Known Problems Father     No Known Problems Mother     No Known Problems Sister        OBHx:   Obstetric History       T2      TAB2   SAB0   E0   M0   L2       # Outcome Date GA Lbr Todd/2nd Weight Sex Delivery Anes PTL Lv   5 Current            4 Term 11 37w0d  2.495 kg (5 lb 8 oz) F Vag-Spont EPI N Y      Complications: Oligohydramnios      Apgar1:  9                Apgar5: 9   3 TAB 2008           2 TAB            1 Term / 40w0d  3.912 kg (8 lb 10 oz) M Vag-Spont EPI N Y      Apgar1:  9                Apgar5: 9          Objective:       Ht 5' 11" (1.803 m)  Wt 80.7 kg (178 lb)  LMP 2016 "  BMI 24.83 kg/m2    General:   alert, appears stated age and cooperative   Lungs:   clear to auscultation bilaterally   Heart:   regular rate and rhythm and S1, S2 normal   Abdomen:  soft, gravid, nontender   Extremities negative edema, negative erythema   FHT: 150, mod BTBV, +accels , reactive and reassuring                 TOCO: Q 2 minutes   Cervix: 10/100/0     Lab Review  Blood Type O POS  GBBS: negative  Rubella: Not immune  RPR: 1T negative  HIV: 1T negative  HepB: negative    Assessment:       40w3d weeks gestation, labor, BUFA, THC/Amphetamine/tobacco use in pregnancy,limited PNC    Patient Active Problem List   Diagnosis    Limited prenatal care in third trimester    ADD (attention deficit disorder)- taking adderol occasionally    Normal pregnancy in third trimester    Abdominal pain in pregnancy          Plan:        - Admit to L&D  - Consents  signed  - CBC, type and screen, drug tox  -anticipate  soon  -social work consult        Larisa Alva MD  OB/GYN  Pager: [phone]

## 2017-03-21 NOTE — PROGRESS NOTES
The Sw was consulted due to the infant being placed for adoption. The Sw spoke to Alesia Willams)with [facility] out of [place], [place]. The pt is a 31 y o()40w3d who gave birth to her daughter 317 at 9:45am via vaginal delivery weighing 8pds/1.3ozs. The adoptive parents are from [place] and currently flying in now and should arrive around 6:30pm today. The pt states she lives with her parents along with her other 2 kids ages 11 yo(boy)and 6 yo(daughter). The pt states she wasn't coerced  or pressured to place the infant for adoption and states the infant's father is incarcerated but agrees with the adoption plan. The Sw informed the pt and Alesia the role of the hospital  and the staff. The Sw neither the staff get involved with the adoption. The role of the Sw is to facilitate the discharge of the infant. The pt states she wants the adoptive parents to take the infant at discharge. The Sw will meet with the adoptive parents tomorrow and again with the birth mother to sign the consents for the hospital. The Sw asked the pt's nurse to get a copy of the adoptive parents license, the Sw informed Alesia of this info and she states she will inform them to have it available. The Sw spoke to Lisy(unit director)and the time for the adoptive parents to bond with the infant is 8am-8:30pm in the nursery if there is no available room. Currently the hospital is full and Alesia has been informed and states she will inform the adoptive parents so they can arrange to get a hotel room near the hospital. The Sw left her contact info with Alesia and the pt.

## 2017-03-21 NOTE — PLAN OF CARE
Problem: Patient Care Overview  Goal: Plan of Care Review  Outcome: Ongoing (interventions implemented as appropriate)   presented 9cm at 40.3 weeks, delivered at 0945 baby girl, no complications, Pt is giving baby up for adoption. Plan of care reviewed and emotional support given. Family at bedside, supportive of pt.

## 2017-03-21 NOTE — PLAN OF CARE
"Pt states she "takes adderall twice a day". Medication ordered is ritalin. Clarified with MD. Pharmacy in house does not carry adderall. Only Ritalin. MD notified. Pt states she ran out of adderall this morning and it is perscribed by and managed by a dr carrillo. Dr. Alva states to offer ritalin. Pt notified.   "

## 2017-03-21 NOTE — PLAN OF CARE
0915 Pt arrives from home, , 2 , 2 TAB, 37.1 started ctx 1 hour ago, SVE 90/0, placed in LDR 4, 361, IV started, Dr Alva called, admit orders received. Pt encouraged to use deep breathing. Friend at bedside giving support, Pt will give baby up for adoption, does not wish to see baby. Admits to adderal use and THC a few times. Will collect u/a for drug tox.     09 Dr Alva at bedside, consents signed.Pt complete, will try to get labs and an epidural    0940 Pt feels pressure and urge to push. Dr Alva at bedside, Placed in litho, AROM at 0943 clear, begins pushing at 0945, delivery of viable baby girl at 0945, nuchal cord reduced, Pt does not wish to see baby. Baby brought to nursery for transition. Pt request pain medicine, orders received for stadol 2 mg and phen 25 mg IVPB. Small perineal laceration no repair needed, delivery of placenta at 0950, recovery plan reviewed. Emotional support given. Pt has father at bedside and family friend.     1030 Pt pain medicine is working, denies pain. Resting, family at bedside.     1045 Pt BP are elevated ranging 150's-160's/80's, Pt denies pain or headache, states her BP always run high takes anxiety meds at times, denies anxiety at this time. Dr Alva paged to update on BP's, reflexes +2, no edema noted.    1100 Dr Alva reviewed BP, and pt history of BP, not in medical record but pt takes anxiety medicine for elevated BP, Orders for labetalol BID first dose at 1200.     1200 Pt updated on medicine for elevated BP, pt still denies need to Void, will let RN now and u/a will be collected. Pt denies pain.     1300 Pt assisted up to restroom, voids without diff, does pericare, voids 500 ml, u/a collected. Pt bp reviewed with Dr Alva since PO BP meds, okay to transfer to mom/baby.     1400 Pt moved via wheelchair to room 349, oriented to room, report given to VINCENT Rangel RN.

## 2017-03-21 NOTE — PROGRESS NOTES
DATE OF DELIVERY: 03/21/2017  TYPE OF DELIVERY: spontaneous vaginal delivery    NARRATIVE:  The patient was fully dilated and pushing under no anesthesia.  Pt AROM- clear fluid. There was a spontaneous vaginal delivery of a viable female infant over an intact perineum. There was a nuchal cord-loose reduced at perineum. The umbilical cord was doubly clamped and cut after 1 min. The infant was handed to awaiting nurse.  Apgars were later awarded by the nurse of 9 and 9.  Weight 3665 g . Umbilical venous blood obtained. Placenta delivered spontaneously and IV pitocin given. With uterine massage the uterus was firm and perivaginal bleeding was normal.  There were first degree bilateral periurethral and 1 perineal lacerations which were not repaired- hemostatic.  No cervical lacerations. Estimated blood loss 300cc. Needle count correct and vaginal sweep performed. Patient tolerated delivery well.    Larisa Alva MD  OB/GYN  Pager: [phone]

## 2017-03-21 NOTE — L&D DELIVERY NOTE
Delivery Information for [Baby information not available]    Birth information:  YOB: 2015   Time of birth: 9:45 AM   Sex: female   Head Delivery Date/Time: 3/21/2017  9:45 AM   Delivery type: Vaginal, Spontaneous Delivery   Gestational Age: 40w3d    Delivery Providers    Delivering clinician:  PETER RICHARDSON                Measurements    Weight:  3665 g            Assessment    Living status:  Yes   Apgars    1 Minute:   5 Minute:   10 Minute 15 Minute 20 Minute   Skin Color: 1  1       Heart Rate: 2  2       Reflex Irritability: 2  2       Muscle Tone: 2  2       Respiratory Effort: 2  2       Total: 9  9                  Apgars Assigned By:  JAVIER CANCINO RN          Assisted Delivery Details:    Forceps attempted?:  No   Vacuum extractor attempted?:  No             Shoulder Dystocia    Shoulder dystocia present?:  No                                             Presentation and Position    Presentation: Vertex   Position: Right    Occiput    Anterior               Interventions/Resuscitation    Method:  Bulb Suctioning, Tactile Stimulation        Cord    Vessels:  3 vessels   Complications:  Nuchal   Nuchal Intervention:  reduced   Nuchal Cord Description:  loose nuchal cord   Delayed Cord Clamping?:  Yes   Cord Clamped Date/Time:  3/21/2017  9:46 AM   Cord Blood Disposition:  Sent with Baby   Gases Sent?:  No        Placenta    Date and time:  3/21/2017  9:50 AM   Removal:  Spontaneous   Appearance:  Intact   Placenta disposition:  Discarded            Labor Events:       labor: No     Labor Onset Date/Time: 2017 08:30     Dilation Complete Date/Time: 2017 09:33     Start Pushing Date/Time: 2017 09:45     Rupture Date/Time:            Rupture type:          Fluid Amount:       Fluid Color:       Fluid Odor:       Membrane Status (PeriCalm):       Rupture Date/Time (PeriCalm):       Fluid Amount (PeriCalm):       Fluid Color (PeriCalm):         steroids: Full Course     Antibiotics given for GBS:       Induction: none     Indications for induction:        Augmentation:       Indications for augmentation:       Labor complications: None     Additional complications:          Cervical ripening:                     Delivery:      Episiotomy: None     Indication for Episiotomy:       Perineal Lacerations: 1st Repaired:  No   Periurethral Laceration: bilateral Repaired: No   Labial Laceration: none Repaired:     Sulcus Laceration: none Repaired:     Vaginal Laceration: No Repaired:     Cervical Laceration: No Repaired:     Repair suture: None     Repair # of packets:       Blood loss (ml): 300     Vaginal Sweep Performed: Yes     Surgicount Correct: Yes       Other providers:       Anesthesia    Method:  None              Details (if applicable):  Trial of Labor      Categorization:      Priority:     Indications for :     Incision Type:       Additional  information:  Forceps:    Vacuum:    Breech:    Observed anomalies    Other (Comments):           DATE OF DELIVERY: 2017  TYPE OF DELIVERY: spontaneous vaginal delivery     NARRATIVE: The patient was fully dilated and pushing under no anesthesia. Pt AROM- clear fluid. There was a spontaneous vaginal delivery of a viable female infant over an intact perineum. There was a nuchal cord-loose reduced at perineum. The umbilical cord was doubly clamped and cut after 1 min. The infant was handed to awaiting nurse. Apgars were later awarded by the nurse of 9 and 9. Weight 3665 g. Umbilical venous blood obtained. Placenta delivered spontaneously and IV pitocin given. With uterine massage the uterus was firm and perivaginal bleeding was normal. There were first degree bilateral periurethral and 1 perineal lacerations which were not repaired- hemostatic. No cervical lacerations. Estimated blood loss 300cc. Needle count correct and vaginal sweep performed. Patient tolerated  delivery well.     Larisa Alva MD  OB/GYN  Pager: [phone]  Larisa Alva MD  OB/GYN  Pager: [phone]

## 2017-03-22 VITALS
DIASTOLIC BLOOD PRESSURE: 87 MMHG | WEIGHT: 178 LBS | RESPIRATION RATE: 18 BRPM | BODY MASS INDEX: 24.92 KG/M2 | HEART RATE: 76 BPM | SYSTOLIC BLOOD PRESSURE: 135 MMHG | HEIGHT: 71 IN | OXYGEN SATURATION: 98 % | TEMPERATURE: 98 F

## 2017-03-22 LAB
BASOPHILS # BLD AUTO: 0.05 K/UL
BASOPHILS NFR BLD: 0.3 %
DIFFERENTIAL METHOD: ABNORMAL
EOSINOPHIL # BLD AUTO: 0.2 K/UL
EOSINOPHIL NFR BLD: 1 %
ERYTHROCYTE [DISTWIDTH] IN BLOOD BY AUTOMATED COUNT: 14.4 %
HCT VFR BLD AUTO: 27.9 %
HGB BLD-MCNC: 8.9 G/DL
LYMPHOCYTES # BLD AUTO: 3.5 K/UL
LYMPHOCYTES NFR BLD: 22.3 %
MCH RBC QN AUTO: 24.9 PG
MCHC RBC AUTO-ENTMCNC: 31.9 %
MCV RBC AUTO: 78 FL
MONOCYTES # BLD AUTO: 0.9 K/UL
MONOCYTES NFR BLD: 5.9 %
NEUTROPHILS # BLD AUTO: 11 K/UL
NEUTROPHILS NFR BLD: 70.5 %
PLATELET # BLD AUTO: 409 K/UL
PMV BLD AUTO: 11.7 FL
RBC # BLD AUTO: 3.57 M/UL
WBC # BLD AUTO: 15.54 K/UL

## 2017-03-22 PROCEDURE — 25000003 PHARM REV CODE 250: Performed by: OBSTETRICS & GYNECOLOGY

## 2017-03-22 PROCEDURE — 85025 COMPLETE CBC W/AUTO DIFF WBC: CPT

## 2017-03-22 PROCEDURE — 36415 COLL VENOUS BLD VENIPUNCTURE: CPT

## 2017-03-22 RX ORDER — FERROUS SULFATE 325(65) MG
325 TABLET ORAL 2 TIMES DAILY
Qty: 30 TABLET | Refills: 3 | Status: SHIPPED | OUTPATIENT
Start: 2017-03-22 | End: 2018-03-22

## 2017-03-22 RX ORDER — IBUPROFEN 600 MG/1
600 TABLET ORAL EVERY 6 HOURS PRN
Qty: 30 TABLET | Refills: 0 | Status: ON HOLD | OUTPATIENT
Start: 2017-03-22 | End: 2019-08-05 | Stop reason: CLARIF

## 2017-03-22 RX ORDER — LABETALOL 200 MG/1
200 TABLET, FILM COATED ORAL EVERY 12 HOURS
Qty: 60 TABLET | Refills: 3 | Status: ON HOLD | OUTPATIENT
Start: 2017-03-22 | End: 2021-01-23 | Stop reason: HOSPADM

## 2017-03-22 RX ORDER — OXYCODONE AND ACETAMINOPHEN 5; 325 MG/1; MG/1
1 TABLET ORAL EVERY 8 HOURS PRN
Qty: 30 TABLET | Refills: 0 | Status: ON HOLD | OUTPATIENT
Start: 2017-03-22 | End: 2019-08-05

## 2017-03-22 RX ORDER — DOCUSATE SODIUM 100 MG/1
200 CAPSULE, LIQUID FILLED ORAL 2 TIMES DAILY PRN
Qty: 60 CAPSULE | Refills: 0 | Status: ON HOLD | OUTPATIENT
Start: 2017-03-22 | End: 2019-08-05

## 2017-03-22 RX ORDER — DEXTROAMPHETAMINE SACCHARATE, AMPHETAMINE ASPARTATE, DEXTROAMPHETAMINE SULFATE AND AMPHETAMINE SULFATE 5; 5; 5; 5 MG/1; MG/1; MG/1; MG/1
1 TABLET ORAL 2 TIMES DAILY
Qty: 14 TABLET | Refills: 0 | Status: SHIPPED | OUTPATIENT
Start: 2017-03-22 | End: 2017-05-14

## 2017-03-22 RX ADMIN — OXYCODONE HYDROCHLORIDE AND ACETAMINOPHEN 1 TABLET: 10; 325 TABLET ORAL at 01:03

## 2017-03-22 RX ADMIN — LABETALOL HYDROCHLORIDE 200 MG: 200 TABLET, FILM COATED ORAL at 09:03

## 2017-03-22 RX ADMIN — METHYLPHENIDATE HYDROCHLORIDE 20 MG: 5 TABLET ORAL at 09:03

## 2017-03-22 RX ADMIN — OXYCODONE HYDROCHLORIDE AND ACETAMINOPHEN 1 TABLET: 10; 325 TABLET ORAL at 09:03

## 2017-03-22 RX ADMIN — IBUPROFEN 600 MG: 600 TABLET ORAL at 01:03

## 2017-03-22 NOTE — PROGRESS NOTES
POSTPARTUM PROGRESS NOTE  Subjective:  Greer Landry is a 31 y.o. female PPD #1 status post Spontaneous vaginal delivery. Patient is doing well this morning. She denies nausea, vomiting, fever or chills. Patient reports mild abdominal pain that is well relieved by PO pain medications. Lochia is mild and stable. Patient is voiding without difficulty and  ambulating with no difficulty. BUFA.    Objective:     Temp:  [97.2 °F (36.2 °C)-99.3 °F (37.4 °C)] 98.4 °F (36.9 °C)  Pulse:  [] 76  Resp:  [16-18] 18  SpO2:  [86 %-100 %] 98 %  BP: (118-164)/(67-93) 145/88    General:   alert, appears stated age and cooperative   Lungs:   clear to auscultation bilaterally   Heart:   regular rate and rhythm and S1, S2 normal   Abdomen:  soft, non-tender; bowel sounds normal; no masses,  no organomegaly   Uterus:  firm located at the umblicus.    Extremities: peripheral pulses normal, no pedal edema, no clubbing or cyanosis     Lab Review  Recent Results (from the past 12 hour(s))   CBC auto differential    Collection Time: 03/22/17  6:14 AM   Result Value Ref Range    WBC 15.54 (H) 3.90 - 12.70 K/uL    RBC 3.57 (L) 4.00 - 5.40 M/uL    Hemoglobin 8.9 (L) 12.0 - 16.0 g/dL    Hematocrit 27.9 (L) 37.0 - 48.5 %    MCV 78 (L) 82 - 98 fL    MCH 24.9 (L) 27.0 - 31.0 pg    MCHC 31.9 (L) 32.0 - 36.0 %    RDW 14.4 11.5 - 14.5 %    Platelets 409 (H) 150 - 350 K/uL    MPV 11.7 9.2 - 12.9 fL    Gran # 11.0 (H) 1.8 - 7.7 K/uL    Lymph # 3.5 1.0 - 4.8 K/uL    Mono # 0.9 0.3 - 1.0 K/uL    Eos # 0.2 0.0 - 0.5 K/uL    Baso # 0.05 0.00 - 0.20 K/uL    Gran% 70.5 38.0 - 73.0 %    Lymph% 22.3 18.0 - 48.0 %    Mono% 5.9 4.0 - 15.0 %    Eosinophil% 1.0 0.0 - 8.0 %    Basophil% 0.3 0.0 - 1.9 %    Differential Method Automated        I/O    Intake/Output Summary (Last 24 hours) at 03/22/17 0719  Last data filed at 03/21/17 1300   Gross per 24 hour   Intake                0 ml   Output              800 ml   Net              -800 ml        Assessment:     Patient Active Problem List   Diagnosis    Limited prenatal care in third trimester    ADD (attention deficit disorder)- taking adderol occasionally    Normal pregnancy in third trimester    Abdominal pain in pregnancy    Normal labor    40 weeks gestation of pregnancy        Plan:   1. Postpartum care:  - Patient doing well. Continue routine management and advances.  - Continue PO pain meds. Pain well controlled.  - Heme: H/h 8.9/27- anemia of pregnancy  - Encourage ambulation    2. CHTN  -pt started on labetalol yesterday. Denies HA/blurry vision/RUQ/epigastric pain. No CP/SOB.  Will continue to monitor BP and if stable will discharge this afternoon with precautions.  -BP: (118-164)/(67-93) 145/88      Dispo: As patient meets milestones, will plan to discharge home today as pt desires. Rx placed in chart. .    Larisa Alva MD  OB/GYN  Pager: [phone]

## 2017-03-22 NOTE — PLAN OF CARE
Pt decided to take ritalin. Dr. Alva notified. Pt placing infant up for adoption. States does not want contact with infant at this time.   Adoptive mother in room, Binta. Pt identified adoptive mother. [facility] adoptive mother can visit in separate room with infant. Identification band given to adoptive mother per patient request. Director, Lisy Walker aware. [facility] adoptive mother can visit in private room with infant from hours of [id number]. Copy of 's license obtained and placed in chart as per director and . ASHLI Askew RN nursery nurse aware

## 2017-03-22 NOTE — PROGRESS NOTES
The Sw spoke to the pt's nurse who states the pt may have a change of heart for the adoption. The Sw spoke to the pt via phone b/c the adoptive parents were in the room with her and she couldn't talk. She informed the Sw she was unsure about the adoption. The Sw asked her if she wants her to come up and speak with her but she stated she will call the Sw back and states she will speak with her friend. The Sw reminded her again she has the right to change her mind and the once she signs the Act of Surrender she can't revoke it. The earliest she can sign is Saturday but can sign anytime after as well. The Sw spoke to the pt's nurse who stated she has a room the adoptive parents can bond with the infant in. The Sw will go in the room and speak with the pt while they are out the room to discuss the d/c plan. The Sw informed the pt a discharge plan must be discussed b/c if the adoptive parents are taking the infant at d/c the proper consents must be signed and she stated she understood and will call the saw back with her determination.       11:26am The Sw met with the pt alone in her room and she stated the paternal grandmother came to the hospital last night and that's why she had a change of heart but states she's going to proceed with the adoption. She states she wasn't coerced or pressured and it's what she wants to do. The Sw reminded her about the Act of Surrender and once she signs it can be revoked. The pt states she understands and will contact the Sw if she changes her mind. The Sw met with the ptEarl)and the adoptive parents in the room. The pt signed the consents giving the adoptive parents permission to take the infant at discharge. The Sw reminded the pt she can change her mind and the staff will honor her wishes at the discharge of the infant. The Sw made copies of the adoptive parents 's license and placed it in the infant and the pt's chart. The adoptive parents are Pramod and Binta Moore.  The Sw informed the infant's nurse and the pt's nurse of the signed consents and informed them the pt has the right to change her mind at discharge and both stated they understood.

## 2017-03-22 NOTE — DISCHARGE SUMMARY
Delivery Discharge Summary  Obstetrics      Primary OB Clinician: Dr. Sandow- [facility]    Admission date: 3/21/2017  Discharge date: 2017    Admit Dx:   Patient Active Problem List   Diagnosis    Limited prenatal care in third trimester    ADD (attention deficit disorder)- taking adderol occasionally    Normal pregnancy in third trimester    Abdominal pain in pregnancy    Normal labor    40 weeks gestation of pregnancy     Discharge Dx:   Patient Active Problem List   Diagnosis    Limited prenatal care in third trimester    ADD (attention deficit disorder)- taking adderol occasionally    Normal pregnancy in third trimester    Abdominal pain in pregnancy    Normal labor    40 weeks gestation of pregnancy       Disposition: To home, self care    Procedure:     Hospital Course:  Pt is a 31 y.o. now  by , PPD # 1 was admitted on 3/21/2017 for . On initial assessment, vital signs were stable.  Patient was subsequently admitted to labor and delivery unit.  Patient subsequently delivered a viable infant, please see delivery information below or full delivery note for further details. Pt was in stable condition post delivery and was transferred to the Mother-Baby Unit in a stable condition. Her postpartum course was uncomplicated. On discharge day, patient's pain is well  controlled with oral pain medications. Pt is tolerating ambulation without SOB or CP, and PO diet without N/V. Reports lochia is minimal in degree. Denies any HA, vision changes, F/C, LE swelling. Post Partum H/H:8.9/27.9 Pt in stable condition and ready for discharge, instructed to continue PNV, pain medications, and to follow up in the OB clinic in 1 week with Dr. Espinoza or me for BP check..    Delivery:    Episiotomy: None   Lacerations: 1st   Repair suture: None   Repair # of packets:     Blood loss (ml): 300     Birth information:  YOB: 2015   Time of birth: 9:45 AM   Sex: female   Delivery type:  Vaginal, Spontaneous Delivery   Gestational Age: 37w5d    Delivery Clinician:      Other providers:       Additional  information:  Forceps:    Vacuum:    Breech:    Observed anomalies      Living?:           APGARS  One minute Five minutes Ten minutes   Skin color:         Heart rate:         Grimace:         Muscle tone:         Breathing:         Totals: 9  9        Placenta: Delivered:       appearance      Disposition:Home or Self Care    Patient Instructions:   Discharge Medication List as of 3/22/2017  1:33 PM      START taking these medications    Details   docusate sodium (COLACE) 100 MG capsule Take 2 capsules (200 mg total) by mouth 2 (two) times daily as needed for Constipation., Starting 3/22/2017, Until Discontinued, Print      ferrous sulfate 325 mg (65 mg iron) Tab tablet Take 1 tablet (325 mg total) by mouth 2 (two) times daily., Starting 3/22/2017, Until Thu 3/22/18, Print      ibuprofen (ADVIL,MOTRIN) 600 MG tablet Take 1 tablet (600 mg total) by mouth every 6 (six) hours as needed (cramping)., Starting 3/22/2017, Until Discontinued, Print      labetalol (NORMODYNE) 200 MG tablet Take 1 tablet (200 mg total) by mouth every 12 (twelve) hours., Starting 3/22/2017, Until Thu 3/22/18, Print      oxycodone-acetaminophen (PERCOCET) 5-325 mg per tablet Take 1 tablet by mouth every 8 (eight) hours as needed., Starting 3/22/2017, Until Discontinued, Print         CONTINUE these medications which have CHANGED    Details   !! dextroamphetamine-amphetamine (ADDERALL) 20 mg tablet Take 1 tablet by mouth 2 (two) times daily., Starting 3/22/2017, Until Discontinued, Print       !! - Potential duplicate medications found. Please discuss with provider.      CONTINUE these medications which have NOT CHANGED    Details   butalbital-acetaminophen-caffeine -40 mg (FIORICET, ESGIC) -40 mg per tablet Take 1 tablet by mouth every 4 (four) hours as needed for Pain., Starting 3/9/2017, Until Sat 4/8/17, Normal       !! dextroamphetamine-amphetamine (ADDERALL) 20 mg tablet Take 1 tablet by mouth 2 (two) times daily., Starting 12/15/2016, Until Discontinued, Print      PNV no.106-iron-folate #6-dha (OB COMPLETE GOLD) 27.5 mg iron- 1 mg Cap Take 27.5 mg by mouth once daily., Starting 9/23/2016, Until Discontinued, Normal       !! - Potential duplicate medications found. Please discuss with provider.      STOP taking these medications       ondansetron (ZOFRAN, AS HYDROCHLORIDE,) 8 MG tablet Comments:   Reason for Stopping:                 Discharge Procedure Orders  Diet general     Activity as tolerated     Call MD for:  temperature >100.4     Call MD for:  persistent nausea and vomiting or diarrhea     Call MD for:  severe uncontrolled pain     Call MD for:  difficulty breathing or increased cough     Call MD for:  severe persistent headache     Call MD for:  worsening rash     Call MD for:  persistent dizziness, light-headedness, or visual disturbances     Call MD for:  increased confusion or weakness     3/21/2017      Larisa Alva MD  OB/GYN  Pager: [phone]

## 2017-03-27 ENCOUNTER — TELEPHONE (OUTPATIENT)
Dept: OBSTETRICS AND GYNECOLOGY | Facility: CLINIC | Age: 32
End: 2017-03-27

## 2017-03-27 NOTE — TELEPHONE ENCOUNTER
----- Message from Lisy Butterfield sent at 3/27/2017 12:35 PM CDT -----  Contact: pt   X_  1st Request  _  2nd Request  _  3rd Request    Who:PAULETTE JONAS [[id number]]    Why: Patient states she would like to speak with the staff in regards to getting a refill on her rx (oxycodone-acetaminophen (PERCOCET) 5-325 mg per tablet).... Please contact patient to further discuss and advise     What Number to Call Back: [phone]    When to Expect a call back: (Before the end of the day)   -- if call after 3:00 call back will be tomorrow.

## 2017-03-27 NOTE — TELEPHONE ENCOUNTER
Pt states that the rx for percocet given when she left the hospital is no longer helping with the pain. She is calling to see if she can get another rx for percocet that is stronger.

## 2017-03-29 ENCOUNTER — TELEPHONE (OUTPATIENT)
Dept: OBSTETRICS AND GYNECOLOGY | Facility: CLINIC | Age: 32
End: 2017-03-29

## 2017-03-29 NOTE — TELEPHONE ENCOUNTER
Agree and pt with uncomplicated vaginal delivery- no additional pain meds will be given. She can try OTC NSAIDS    Larisa Alva MD  OB/GYN  Pager: [phone]

## 2017-03-29 NOTE — TELEPHONE ENCOUNTER
----- Message from Thalia Smith sent at 3/28/2017 12:19 PM CDT -----  Contact: PAULETTE JONAS [[id number]]  x_  1st Request  _  2nd Request  _  3rd Request        Who: PAULETTE JONAS [[id number]]    Why: pt states she would like a call back in regards to Rx Percocet     What Number to Call Back: [phone]    When to Expect a call back: (Before the end of the day)   -- if call after 3:00 call back will be tomorrow.

## 2017-03-29 NOTE — TELEPHONE ENCOUNTER
Tried calling patient   She sent a my chart medication refill request for percocet.  Sent message back to patient after trying to call her.  Informed her in the message she should not be out of medication as she was given thirty tablets of percocet on 3/22/17.

## 2017-03-29 NOTE — TELEPHONE ENCOUNTER
Left message to patient to call the office at [phone] to discuss rx for percocet. Pt can take a percocet every 4 hours as needed per Alexis

## 2017-04-28 ENCOUNTER — TELEPHONE (OUTPATIENT)
Dept: OBSTETRICS AND GYNECOLOGY | Facility: CLINIC | Age: 32
End: 2017-04-28

## 2017-04-28 NOTE — TELEPHONE ENCOUNTER
Called patient regarding missed appt. No answer . Left message for her to call back and scheduled postpartum visit

## 2017-06-26 PROBLEM — Z37.9 NORMAL LABOR: Status: RESOLVED | Noted: 2017-03-21 | Resolved: 2017-06-26

## 2018-02-26 PROBLEM — Z3A.40 40 WEEKS GESTATION OF PREGNANCY: Status: RESOLVED | Noted: 2017-03-21 | Resolved: 2018-02-26

## 2018-06-16 ENCOUNTER — PATIENT MESSAGE (OUTPATIENT)
Dept: OBSTETRICS AND GYNECOLOGY | Facility: CLINIC | Age: 33
End: 2018-06-16

## 2018-06-18 NOTE — TELEPHONE ENCOUNTER
Pt requesting Rx for Fiorcet. I went to check her chart and wasn't sure if I could schedule her in an EP slot being that I think you delivered her 03/2017? But she's never seen you in clinic.

## 2018-06-18 NOTE — TELEPHONE ENCOUNTER
I have no documentation of pregnancy so before filling any prescriptions I would need to see her to establish prenatal care.     Larisa Alva MD, FACOG  OB/GYN  Pager: [phone]

## 2018-06-26 ENCOUNTER — PATIENT MESSAGE (OUTPATIENT)
Dept: OBSTETRICS AND GYNECOLOGY | Facility: CLINIC | Age: 33
End: 2018-06-26

## 2018-07-11 ENCOUNTER — TELEPHONE (OUTPATIENT)
Dept: OBSTETRICS AND GYNECOLOGY | Facility: CLINIC | Age: 33
End: 2018-07-11

## 2018-07-11 NOTE — TELEPHONE ENCOUNTER
----- Message from Stephenie Stevenson sent at 7/11/2018  1:04 PM CDT -----  Contact: [phone]  Pt its requesting an appointment as soon as possible , states she is in her third trimester. Please advise

## 2019-08-05 ENCOUNTER — HOSPITAL ENCOUNTER (INPATIENT)
Facility: HOSPITAL | Age: 34
LOS: 1 days | Discharge: HOME | DRG: 779 | End: 2019-08-06
Attending: OBSTETRICS & GYNECOLOGY | Admitting: OBSTETRICS & GYNECOLOGY

## 2019-08-05 DIAGNOSIS — O03.9 SPONTANEOUS ABORTION: Primary | ICD-10-CM

## 2019-08-05 LAB
ABO + RH BLD: NORMAL
ANISOCYTOSIS BLD QL SMEAR: SLIGHT
BASOPHILS # BLD AUTO: ABNORMAL K/UL (ref 0–0.2)
BASOPHILS NFR BLD: 0 % (ref 0–1.9)
BLD GP AB SCN CELLS X3 SERPL QL: NORMAL
DIFFERENTIAL METHOD: ABNORMAL
EOSINOPHIL # BLD AUTO: ABNORMAL K/UL (ref 0–0.5)
EOSINOPHIL NFR BLD: 0 % (ref 0–8)
ERYTHROCYTE [DISTWIDTH] IN BLOOD BY AUTOMATED COUNT: 14.3 % (ref 11.5–14.5)
HCT VFR BLD AUTO: 30.5 % (ref 37–48.5)
HGB BLD-MCNC: 10.4 G/DL (ref 12–16)
LYMPHOCYTES # BLD AUTO: ABNORMAL K/UL (ref 1–4.8)
LYMPHOCYTES NFR BLD: 3 % (ref 18–48)
MCH RBC QN AUTO: 29.5 PG (ref 27–31)
MCHC RBC AUTO-ENTMCNC: 34.1 G/DL (ref 32–36)
MCV RBC AUTO: 86 FL (ref 82–98)
MONOCYTES # BLD AUTO: ABNORMAL K/UL (ref 0.3–1)
MONOCYTES NFR BLD: 3 % (ref 4–15)
NEUTROPHILS NFR BLD: 85 % (ref 38–73)
NEUTS BAND NFR BLD MANUAL: 9 %
PLATELET # BLD AUTO: 251 K/UL (ref 150–350)
PLATELET BLD QL SMEAR: NORMAL
PMV BLD AUTO: 9.1 FL (ref 9.2–12.9)
POIKILOCYTOSIS BLD QL SMEAR: ABNORMAL
RBC # BLD AUTO: 3.53 M/UL (ref 4–5.4)
WBC # BLD AUTO: 27.39 K/UL (ref 3.9–12.7)

## 2019-08-05 PROCEDURE — 88307 TISSUE EXAM BY PATHOLOGIST: CPT | Mod: 26,,, | Performed by: PATHOLOGY

## 2019-08-05 PROCEDURE — 11000001 HC ACUTE MED/SURG PRIVATE ROOM

## 2019-08-05 PROCEDURE — 59414 PR DELIVER PLACENTA: ICD-10-PCS | Mod: ,,, | Performed by: OBSTETRICS & GYNECOLOGY

## 2019-08-05 PROCEDURE — 85027 COMPLETE CBC AUTOMATED: CPT

## 2019-08-05 PROCEDURE — 86850 RBC ANTIBODY SCREEN: CPT

## 2019-08-05 PROCEDURE — 59414 DELIVER PLACENTA: CPT | Mod: ,,, | Performed by: OBSTETRICS & GYNECOLOGY

## 2019-08-05 PROCEDURE — 99223 PR INITIAL HOSPITAL CARE,LEVL III: ICD-10-PCS | Mod: 57,,, | Performed by: OBSTETRICS & GYNECOLOGY

## 2019-08-05 PROCEDURE — 63600175 PHARM REV CODE 636 W HCPCS: Performed by: OBSTETRICS & GYNECOLOGY

## 2019-08-05 PROCEDURE — 87040 BLOOD CULTURE FOR BACTERIA: CPT

## 2019-08-05 PROCEDURE — 25000003 PHARM REV CODE 250: Performed by: OBSTETRICS & GYNECOLOGY

## 2019-08-05 PROCEDURE — 88307 TISSUE SPECIMEN TO PATHOLOGY - SURGERY: ICD-10-PCS | Mod: 26,,, | Performed by: PATHOLOGY

## 2019-08-05 PROCEDURE — 59414 DELIVER PLACENTA: CPT

## 2019-08-05 PROCEDURE — 85007 BL SMEAR W/DIFF WBC COUNT: CPT

## 2019-08-05 PROCEDURE — 99223 1ST HOSP IP/OBS HIGH 75: CPT | Mod: 57,,, | Performed by: OBSTETRICS & GYNECOLOGY

## 2019-08-05 PROCEDURE — 88307 TISSUE EXAM BY PATHOLOGIST: CPT | Performed by: PATHOLOGY

## 2019-08-05 PROCEDURE — 36415 COLL VENOUS BLD VENIPUNCTURE: CPT

## 2019-08-05 RX ORDER — BUTORPHANOL TARTRATE 2 MG/ML
2 INJECTION INTRAMUSCULAR; INTRAVENOUS ONCE AS NEEDED
Status: COMPLETED | OUTPATIENT
Start: 2019-08-05 | End: 2019-08-05

## 2019-08-05 RX ORDER — ACETAMINOPHEN 325 MG/1
650 TABLET ORAL EVERY 6 HOURS PRN
Status: DISCONTINUED | OUTPATIENT
Start: 2019-08-05 | End: 2019-08-06 | Stop reason: HOSPADM

## 2019-08-05 RX ORDER — MISOPROSTOL 200 UG/1
200 TABLET ORAL ONCE
Status: COMPLETED | OUTPATIENT
Start: 2019-08-05 | End: 2019-08-05

## 2019-08-05 RX ORDER — SODIUM CHLORIDE, SODIUM LACTATE, POTASSIUM CHLORIDE, CALCIUM CHLORIDE 600; 310; 30; 20 MG/100ML; MG/100ML; MG/100ML; MG/100ML
INJECTION, SOLUTION INTRAVENOUS CONTINUOUS
Status: DISCONTINUED | OUTPATIENT
Start: 2019-08-05 | End: 2019-08-06 | Stop reason: HOSPADM

## 2019-08-05 RX ORDER — HYDROCORTISONE 25 MG/G
CREAM TOPICAL 3 TIMES DAILY PRN
Status: DISCONTINUED | OUTPATIENT
Start: 2019-08-05 | End: 2019-08-06 | Stop reason: HOSPADM

## 2019-08-05 RX ORDER — OXYCODONE AND ACETAMINOPHEN 5; 325 MG/1; MG/1
1 TABLET ORAL EVERY 4 HOURS PRN
Status: DISCONTINUED | OUTPATIENT
Start: 2019-08-05 | End: 2019-08-06 | Stop reason: HOSPADM

## 2019-08-05 RX ORDER — MISOPROSTOL 200 UG/1
200 TABLET ORAL EVERY 6 HOURS
Status: DISCONTINUED | OUTPATIENT
Start: 2019-08-05 | End: 2019-08-06 | Stop reason: HOSPADM

## 2019-08-05 RX ORDER — NAPROXEN 500 MG/1
500 TABLET ORAL EVERY 8 HOURS PRN
Status: DISCONTINUED | OUTPATIENT
Start: 2019-08-05 | End: 2019-08-06 | Stop reason: HOSPADM

## 2019-08-05 RX ORDER — DIPHENHYDRAMINE HCL 25 MG
25 CAPSULE ORAL EVERY 4 HOURS PRN
Status: DISCONTINUED | OUTPATIENT
Start: 2019-08-05 | End: 2019-08-06 | Stop reason: HOSPADM

## 2019-08-05 RX ORDER — DIPHENHYDRAMINE HYDROCHLORIDE 50 MG/ML
25 INJECTION INTRAMUSCULAR; INTRAVENOUS EVERY 4 HOURS PRN
Status: DISCONTINUED | OUTPATIENT
Start: 2019-08-05 | End: 2019-08-06 | Stop reason: HOSPADM

## 2019-08-05 RX ORDER — OXYCODONE AND ACETAMINOPHEN 10; 325 MG/1; MG/1
1 TABLET ORAL EVERY 4 HOURS PRN
Status: DISCONTINUED | OUTPATIENT
Start: 2019-08-05 | End: 2019-08-06 | Stop reason: HOSPADM

## 2019-08-05 RX ORDER — ONDANSETRON 8 MG/1
8 TABLET, ORALLY DISINTEGRATING ORAL EVERY 8 HOURS PRN
Status: DISCONTINUED | OUTPATIENT
Start: 2019-08-05 | End: 2019-08-06 | Stop reason: HOSPADM

## 2019-08-05 RX ADMIN — MISOPROSTOL 200 MCG: 200 TABLET ORAL at 01:08

## 2019-08-05 RX ADMIN — MISOPROSTOL 200 MCG: 200 TABLET ORAL at 11:08

## 2019-08-05 RX ADMIN — SODIUM CHLORIDE, SODIUM LACTATE, POTASSIUM CHLORIDE, AND CALCIUM CHLORIDE: .6; .31; .03; .02 INJECTION, SOLUTION INTRAVENOUS at 01:08

## 2019-08-05 RX ADMIN — ACETAMINOPHEN 650 MG: 325 TABLET ORAL at 06:08

## 2019-08-05 RX ADMIN — MISOPROSTOL 200 MCG: 200 TABLET ORAL at 05:08

## 2019-08-05 RX ADMIN — BUTORPHANOL TARTRATE 2 MG: 2 INJECTION, SOLUTION INTRAMUSCULAR; INTRAVENOUS at 03:08

## 2019-08-05 NOTE — PLAN OF CARE
Problem: Adult Inpatient Plan of Care  Goal: Plan of Care Review  Outcome: Ongoing (interventions implemented as appropriate)  Placenta delivered.  Bleeding small.  Vss. Baby still at bedside. Pt and s/o viewing baby intermittently, want to keep at bedside for now.  Grieving appropriately.

## 2019-08-05 NOTE — NURSING
Dr Wiedemann at bedside, sve done, placenta still intact.  Bleeding moderate. Orders rec'd to draw CBC, T&S, give cytotec 200 po and 200 rectal, may have stadol 2mg iv prn pain x 1, and call when placenta delivers,rb&v.    1439  Informed Dr Wiedemann of large amount of bleeding and clots, will start weighing pads, and placenta still intact. Chaplain Leslie, at bedside.     1445  Dr Wiedemann to bedside, sve done, placenta still intact.  Pt to receive stadol for comfort and will continue to monitor bleeding and for placenta separation.     1455  MD aware of temp now 99.8.  Order rec'd for blood culture.

## 2019-08-05 NOTE — PROGRESS NOTES
Returned to check pt  With mild abd pressure and valsalva  Placenta easily delivered  Appears intact  Digital exam done, few clots in cx os  No more placenta palpable  Discussed that no sure way to be conf that all is out, but will keep her on oral cytotec through the pm to zamzam

## 2019-08-05 NOTE — H&P
HISTORY AND PHYSICAL    Chief Complaint: post delivery 19 week fetus at home  History of Present Illness: pt with no prenatal care, recently found out was pregnant, felt fine yesterday, began with cramps and bleeding this am, passed a non viable fetus 19 week size, male\  Placenta still in utero  Past Medical History: hx of elevated bp with last pregnancy, but no chronic htn or meds  Hx add    Past Surgical History: neg  Past ob hx  Term vag delivery x 3  Past Social History:  reports that she has been smoking cigarettes.  She has been smoking about 0.50 packs per day. She does not have any smokeless tobacco history on file. She reports that she has current or past drug history. Drug: Marijuana. She reports that she does not drink alcohol.  Family History: neg  Allergies: Patient has no known allergies.   Current Medications:   Medications Prior to Admission   Medication Sig Dispense Refill Last Dose    PNV no.106-iron-folate #6-dha (OB COMPLETE GOLD) 27.5 mg iron- 1 mg Cap Take 27.5 mg by mouth once daily. 30 capsule 11 Past Week at Unknown time    labetalol (NORMODYNE) 200 MG tablet Take 1 tablet (200 mg total) by mouth every 12 (twelve) hours. 60 tablet 3       Review of Systems: ROS:  GENERAL: No fever, chills, fatigability or weight loss.  SKIN: No rashes, itching or changes in color or texture of skin.  HEAD: No headaches or recent head trauma.  EYES: Visual acuity fine. No photophobia, ocular pain or diplopia.  EARS: Denies ear pain, discharge or vertigo.  NOSE: No loss of smell, no epistaxis or postnasal drip.  MOUTH & THROAT: No hoarseness or change in voice. No excessive gum bleeding.  NODES: Denies swollen glands.  CHEST: Denies BLACK, cyanosis, wheezing, cough and sputum production.  CARDIOVASCULAR: Denies chest pain, PND, orthopnea or reduced exercise tolerance.  ABDOMEN: Appetite fine. No weight loss. Denies diarrhea, abdominal pain, hematemesis or blood in stool.  URINARY: No flank pain, dysuria or  hematuria.  PERIPHERAL VASCULAR: No claudication or cyanosis.  MUSCULOSKELETAL: No joint stiffness or swelling. Denies back pain.  NEUROLOGIC: No history of seizures, paralysis, alteration of gait or coordination     Physical Exam:   Vss, afebrile  Head/neck normal  [place]  Chest nromal  abd soft nontender, no masses  Pelvic, cord going into vagina up in uterus, cord clamp inplace, mild clots  No masses  extr nromal, no edema    Assessment/Plan  Post spont  19 weeks, delivered fetus at home, nonviable  Retained placneta  Discussed with pt indeteail  Will admin oral and vag cytotec in hopes of del placneta  Consents done  Routine labs

## 2019-08-05 NOTE — PROGRESS NOTES
Oral and vag cytotec 200 each about an hour ago  Slight increase in bleeding  Placenta still in uterus  Pt cannot tolerate exam  rec epidural  Pt wants iv sedation for now

## 2019-08-05 NOTE — NURSING
Arrived to the unit via EMS accompanied by s.o. Delivered at home at 1130.  No signs of life per EMS. Pt assumed she was 8-10 weeks gestation due to having irregular menstrual cycles.  Baby appears to be around 18 weeks gestation.  No deformities noted.  Reports pain started around 0700.  LMP reported 3/25/19.  Reports taking multiple pregnancy test with negative results, then one not long ago being positive.  Had an initial prenatal appointment scheduled for Aug [facility] at [facility].  Crying off and on.  Placenta undelivered.  Small-moderate amount of bleeding.  Reviewed poc to notify on call MD for orders, nothing to eat, and bedpan for voiding.  Verbal recall. Call light within reach.

## 2019-08-06 ENCOUNTER — TELEPHONE (OUTPATIENT)
Dept: OBSTETRICS AND GYNECOLOGY | Facility: CLINIC | Age: 34
End: 2019-08-06

## 2019-08-06 ENCOUNTER — CLINICAL SUPPORT (OUTPATIENT)
Dept: SMOKING CESSATION | Facility: CLINIC | Age: 34
End: 2019-08-06

## 2019-08-06 VITALS
OXYGEN SATURATION: 100 % | DIASTOLIC BLOOD PRESSURE: 55 MMHG | TEMPERATURE: 99 F | HEIGHT: 72 IN | RESPIRATION RATE: 16 BRPM | BODY MASS INDEX: 21.94 KG/M2 | SYSTOLIC BLOOD PRESSURE: 100 MMHG | WEIGHT: 162 LBS | HEART RATE: 75 BPM

## 2019-08-06 LAB
BASOPHILS # BLD AUTO: 0.02 K/UL (ref 0–0.2)
BASOPHILS NFR BLD: 0.1 % (ref 0–1.9)
DIFFERENTIAL METHOD: ABNORMAL
EOSINOPHIL # BLD AUTO: 0.2 K/UL (ref 0–0.5)
EOSINOPHIL NFR BLD: 1.3 % (ref 0–8)
ERYTHROCYTE [DISTWIDTH] IN BLOOD BY AUTOMATED COUNT: 14.8 % (ref 11.5–14.5)
HCT VFR BLD AUTO: 27.5 % (ref 37–48.5)
HGB BLD-MCNC: 8.9 G/DL (ref 12–16)
LYMPHOCYTES # BLD AUTO: 2.5 K/UL (ref 1–4.8)
LYMPHOCYTES NFR BLD: 15.1 % (ref 18–48)
MCH RBC QN AUTO: 28.5 PG (ref 27–31)
MCHC RBC AUTO-ENTMCNC: 32.4 G/DL (ref 32–36)
MCV RBC AUTO: 88 FL (ref 82–98)
MONOCYTES # BLD AUTO: 0.7 K/UL (ref 0.3–1)
MONOCYTES NFR BLD: 4.4 % (ref 4–15)
NEUTROPHILS # BLD AUTO: 12.9 K/UL (ref 1.8–7.7)
NEUTROPHILS NFR BLD: 79.1 % (ref 38–73)
PLATELET # BLD AUTO: 269 K/UL (ref 150–350)
PMV BLD AUTO: 10.1 FL (ref 9.2–12.9)
RBC # BLD AUTO: 3.12 M/UL (ref 4–5.4)
WBC # BLD AUTO: 16.33 K/UL (ref 3.9–12.7)

## 2019-08-06 PROCEDURE — 25000003 PHARM REV CODE 250: Performed by: OBSTETRICS & GYNECOLOGY

## 2019-08-06 PROCEDURE — 36415 COLL VENOUS BLD VENIPUNCTURE: CPT

## 2019-08-06 PROCEDURE — 85025 COMPLETE CBC W/AUTO DIFF WBC: CPT

## 2019-08-06 PROCEDURE — 88300 SURGICAL PATH GROSS: CPT | Mod: 26,,, | Performed by: PATHOLOGY

## 2019-08-06 PROCEDURE — 99999 PR PBB SHADOW E&M-EST. PATIENT-LVL I: CPT | Mod: PBBFAC,,,

## 2019-08-06 PROCEDURE — 88300 TISSUE SPECIMEN TO PATHOLOGY - SURGERY: ICD-10-PCS | Mod: 26,,, | Performed by: PATHOLOGY

## 2019-08-06 PROCEDURE — 99238 PR HOSPITAL DISCHARGE DAY,<30 MIN: ICD-10-PCS | Mod: ,,, | Performed by: OBSTETRICS & GYNECOLOGY

## 2019-08-06 PROCEDURE — 99999 PR PBB SHADOW E&M-EST. PATIENT-LVL I: ICD-10-PCS | Mod: PBBFAC,,,

## 2019-08-06 PROCEDURE — 88300 SURGICAL PATH GROSS: CPT | Performed by: PATHOLOGY

## 2019-08-06 PROCEDURE — 99406 BEHAV CHNG SMOKING 3-10 MIN: CPT | Mod: S$PBB,,,

## 2019-08-06 PROCEDURE — 99406 PT REFUSED TOBACCO CESSATION: ICD-10-PCS | Mod: S$PBB,,,

## 2019-08-06 PROCEDURE — 99238 HOSP IP/OBS DSCHRG MGMT 30/<: CPT | Mod: ,,, | Performed by: OBSTETRICS & GYNECOLOGY

## 2019-08-06 RX ORDER — NAPROXEN 500 MG/1
500 TABLET ORAL 2 TIMES DAILY WITH MEALS
Qty: 20 TABLET | Refills: 1 | Status: SHIPPED | OUTPATIENT
Start: 2019-08-06 | End: 2020-08-05

## 2019-08-06 RX ADMIN — OXYCODONE HYDROCHLORIDE AND ACETAMINOPHEN 1 TABLET: 10; 325 TABLET ORAL at 06:08

## 2019-08-06 RX ADMIN — MISOPROSTOL 200 MCG: 200 TABLET ORAL at 05:08

## 2019-08-06 NOTE — CHAPLAIN
Follow up visit with patient. Spouse not there. Patient had brighter affect and said she is feeling better.She asked if [facility] was contacted.  I said yes they were and burial will probably be in September. Pt. Accepted this.

## 2019-08-06 NOTE — NURSING
Discharge instructions given and discussed with patient. Patient ambulated to front entrance to be discharged home. Instructed to return to the unit for any problems.

## 2019-08-06 NOTE — DISCHARGE INSTRUCTIONS
Patient instructed to the unit for any problems. Pt informed that someone from Dr/ Wiedemann's office will be calling her to set up follow up appointment. Instructed to go to the nearest ER for any heavy bleeding or other problems.

## 2019-08-06 NOTE — DISCHARGE SUMMARY
Discharge Summary      Admission date: 2019  Discharge date:      Principle Diagnosis: spontaneous  2nd trimester  Secondary Diagnosis: same  Discharge Diagnosis: same    Procedures Performed: delivery of placenta    Hospital Course:  Pt is a 34 y.o. admitted for pt admitted with spont  of  Nonviable 19 week male fetus at home,  Presented as unreferred  Given cytotec to deliver placenta  Presently pp day 1  See notes, doing well  Discussed prob dc few hours    Discharge Disposition: Home or Self Care    Patient Instructions:   Current Discharge Medication List      CONTINUE these medications which have NOT CHANGED    Details   PNV no.106-iron-folate #6-dha (OB COMPLETE GOLD) 27.5 mg iron- 1 mg Cap Take 27.5 mg by mouth once daily.  Qty: 30 capsule, Refills: 11    Associated Diagnoses: Encounter for supervision of other normal pregnancy in first trimester      labetalol (NORMODYNE) 200 MG tablet Take 1 tablet (200 mg total) by mouth every 12 (twelve) hours.  Qty: 60 tablet, Refills: 3

## 2019-08-06 NOTE — PROGRESS NOTES
Smoking cessation education provided.  Pt denies nicotine withdrawal symptoms.  Pt referred to 1-800-QUIT-NOW for additional smoking cessation resources following hospital discharge.

## 2019-08-06 NOTE — PROGRESS NOTES
Pp day 1 from spont miss  Vss, afebrile  Pt sitting up in bed doing well, eating  States she feels fine  Up voiding, min bleeding  abd soft  Fundus firm  extr nromal  A doing well  Plan, discussed prob dc in few hours if cont to do well  Will send rx fo rnaaprox  Fu with me or reg md
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
## 2019-08-06 NOTE — PLAN OF CARE
Problem: Adult Inpatient Plan of Care  Goal: Plan of Care Review  0615 Pt has slept fairly well through the night, c/o headache 8/10 of pain scale, given medication for same. Lochia has been light rubra, no clots or tissue passed through the night.

## 2019-08-08 ENCOUNTER — TELEPHONE (OUTPATIENT)
Dept: OBSTETRICS AND GYNECOLOGY | Facility: CLINIC | Age: 34
End: 2019-08-08

## 2019-08-08 NOTE — TELEPHONE ENCOUNTER
----- Message from Michael A. Wiedemann, MD sent at 8/8/2019  6:56 AM CDT -----  She had a miss the other day  Can make appt with me in 2-3 weeks or she can fu with her other MD  Its her choice, thanks

## 2019-08-10 LAB — BACTERIA BLD CULT: NORMAL

## 2020-01-03 NOTE — NURSING
Fetus placed in lab specimen container, label indicating for gloria. Will be brought down to lab when we receive confirmation from katalina Nelson.   Isotretinoin Pregnancy And Lactation Text: This medication is Pregnancy Category X and is considered extremely dangerous during pregnancy. It is unknown if it is excreted in breast milk.

## 2020-07-20 ENCOUNTER — TELEPHONE (OUTPATIENT)
Dept: OBSTETRICS AND GYNECOLOGY | Facility: CLINIC | Age: 35
End: 2020-07-20

## 2020-07-20 RX ORDER — NAPROXEN 500 MG/1
500 TABLET ORAL 2 TIMES DAILY WITH MEALS
Qty: 20 TABLET | Refills: 1 | Status: CANCELLED | OUTPATIENT
Start: 2020-07-20 | End: 2021-07-20

## 2020-07-20 NOTE — TELEPHONE ENCOUNTER
Pt is requesting Rx for naproxen. Message has been sent to Dr. Braden today as well from pt. This patient has not seen any providers in the office. Was seen by you in the hospital for a mis
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
## 2020-07-27 ENCOUNTER — OFFICE VISIT (OUTPATIENT)
Dept: OBSTETRICS AND GYNECOLOGY | Facility: CLINIC | Age: 35
End: 2020-07-27
Payer: MEDICAID

## 2020-07-27 ENCOUNTER — LAB VISIT (OUTPATIENT)
Dept: LAB | Facility: HOSPITAL | Age: 35
End: 2020-07-27
Attending: OBSTETRICS & GYNECOLOGY
Payer: MEDICAID

## 2020-07-27 VITALS
WEIGHT: 144.19 LBS | DIASTOLIC BLOOD PRESSURE: 64 MMHG | BODY MASS INDEX: 19.55 KG/M2 | SYSTOLIC BLOOD PRESSURE: 110 MMHG

## 2020-07-27 DIAGNOSIS — N91.2 AMENORRHEA: Primary | ICD-10-CM

## 2020-07-27 DIAGNOSIS — G44.009 CLUSTER HEADACHE, NOT INTRACTABLE, UNSPECIFIED CHRONICITY PATTERN: ICD-10-CM

## 2020-07-27 DIAGNOSIS — N91.2 AMENORRHEA: ICD-10-CM

## 2020-07-27 DIAGNOSIS — R11.0 NAUSEA: ICD-10-CM

## 2020-07-27 DIAGNOSIS — Z01.419 ROUTINE GYNECOLOGICAL EXAMINATION: ICD-10-CM

## 2020-07-27 DIAGNOSIS — Z34.90 GRAVIDA 6, CURRENTLY PREGNANT: ICD-10-CM

## 2020-07-27 LAB
ABO + RH BLD: NORMAL
ALBUMIN SERPL BCP-MCNC: 3.1 G/DL (ref 3.5–5.2)
ALP SERPL-CCNC: 57 U/L (ref 55–135)
ALT SERPL W/O P-5'-P-CCNC: 15 U/L (ref 10–44)
ANION GAP SERPL CALC-SCNC: 5 MMOL/L (ref 8–16)
AST SERPL-CCNC: 10 U/L (ref 10–40)
BASOPHILS # BLD AUTO: 0.05 K/UL (ref 0–0.2)
BASOPHILS NFR BLD: 0.5 % (ref 0–1.9)
BILIRUB SERPL-MCNC: 0.2 MG/DL (ref 0.1–1)
BLD GP AB SCN CELLS X3 SERPL QL: NORMAL
BUN SERPL-MCNC: 5 MG/DL (ref 6–20)
CALCIUM SERPL-MCNC: 8.7 MG/DL (ref 8.7–10.5)
CHLORIDE SERPL-SCNC: 106 MMOL/L (ref 95–110)
CO2 SERPL-SCNC: 26 MMOL/L (ref 23–29)
CREAT SERPL-MCNC: 0.6 MG/DL (ref 0.5–1.4)
CREAT UR-MCNC: 128 MG/DL (ref 15–325)
DIFFERENTIAL METHOD: ABNORMAL
EOSINOPHIL # BLD AUTO: 0.1 K/UL (ref 0–0.5)
EOSINOPHIL NFR BLD: 1.3 % (ref 0–8)
ERYTHROCYTE [DISTWIDTH] IN BLOOD BY AUTOMATED COUNT: 13.6 % (ref 11.5–14.5)
EST. GFR  (AFRICAN AMERICAN): >60 ML/MIN/1.73 M^2
EST. GFR  (NON AFRICAN AMERICAN): >60 ML/MIN/1.73 M^2
GLUCOSE SERPL-MCNC: 87 MG/DL (ref 70–110)
HCT VFR BLD AUTO: 35.4 % (ref 37–48.5)
HGB BLD-MCNC: 11.6 G/DL (ref 12–16)
IMM GRANULOCYTES # BLD AUTO: 0.03 K/UL (ref 0–0.04)
IMM GRANULOCYTES NFR BLD AUTO: 0.3 % (ref 0–0.5)
LYMPHOCYTES # BLD AUTO: 2.1 K/UL (ref 1–4.8)
LYMPHOCYTES NFR BLD: 23 % (ref 18–48)
MCH RBC QN AUTO: 29.2 PG (ref 27–31)
MCHC RBC AUTO-ENTMCNC: 32.8 G/DL (ref 32–36)
MCV RBC AUTO: 89 FL (ref 82–98)
MONOCYTES # BLD AUTO: 0.4 K/UL (ref 0.3–1)
MONOCYTES NFR BLD: 4.5 % (ref 4–15)
NEUTROPHILS # BLD AUTO: 6.5 K/UL (ref 1.8–7.7)
NEUTROPHILS NFR BLD: 70.4 % (ref 38–73)
NRBC BLD-RTO: 0 /100 WBC
PLATELET # BLD AUTO: 394 K/UL (ref 150–350)
PMV BLD AUTO: 10.3 FL (ref 9.2–12.9)
POTASSIUM SERPL-SCNC: 4.8 MMOL/L (ref 3.5–5.1)
PROT SERPL-MCNC: 6.3 G/DL (ref 6–8.4)
PROT UR-MCNC: 15 MG/DL (ref 0–15)
PROT/CREAT UR: 0.12 MG/G{CREAT} (ref 0–0.2)
RBC # BLD AUTO: 3.97 M/UL (ref 4–5.4)
SODIUM SERPL-SCNC: 137 MMOL/L (ref 136–145)
WBC # BLD AUTO: 9.17 K/UL (ref 3.9–12.7)

## 2020-07-27 PROCEDURE — 83020 HEMOGLOBIN ELECTROPHORESIS: CPT

## 2020-07-27 PROCEDURE — 81220 CFTR GENE COM VARIANTS: CPT

## 2020-07-27 PROCEDURE — 86762 RUBELLA ANTIBODY: CPT

## 2020-07-27 PROCEDURE — 87340 HEPATITIS B SURFACE AG IA: CPT

## 2020-07-27 PROCEDURE — 87491 CHLMYD TRACH DNA AMP PROBE: CPT

## 2020-07-27 PROCEDURE — 86592 SYPHILIS TEST NON-TREP QUAL: CPT

## 2020-07-27 PROCEDURE — 85025 COMPLETE CBC W/AUTO DIFF WBC: CPT

## 2020-07-27 PROCEDURE — 99204 PR OFFICE/OUTPT VISIT, NEW, LEVL IV, 45-59 MIN: ICD-10-PCS | Mod: TH,S$PBB,, | Performed by: OBSTETRICS & GYNECOLOGY

## 2020-07-27 PROCEDURE — 80053 COMPREHEN METABOLIC PANEL: CPT

## 2020-07-27 PROCEDURE — 86850 RBC ANTIBODY SCREEN: CPT

## 2020-07-27 PROCEDURE — 99213 OFFICE O/P EST LOW 20 MIN: CPT | Mod: PBBFAC,TH,PO,25 | Performed by: OBSTETRICS & GYNECOLOGY

## 2020-07-27 PROCEDURE — 99999 PR PBB SHADOW E&M-EST. PATIENT-LVL III: CPT | Mod: PBBFAC,,, | Performed by: OBSTETRICS & GYNECOLOGY

## 2020-07-27 PROCEDURE — 86703 HIV-1/HIV-2 1 RESULT ANTBDY: CPT

## 2020-07-27 PROCEDURE — 99999 PR PBB SHADOW E&M-EST. PATIENT-LVL III: ICD-10-PCS | Mod: PBBFAC,,, | Performed by: OBSTETRICS & GYNECOLOGY

## 2020-07-27 PROCEDURE — 87086 URINE CULTURE/COLONY COUNT: CPT

## 2020-07-27 PROCEDURE — 36415 COLL VENOUS BLD VENIPUNCTURE: CPT

## 2020-07-27 PROCEDURE — 86787 VARICELLA-ZOSTER ANTIBODY: CPT

## 2020-07-27 PROCEDURE — 99204 OFFICE O/P NEW MOD 45 MIN: CPT | Mod: TH,S$PBB,, | Performed by: OBSTETRICS & GYNECOLOGY

## 2020-07-27 PROCEDURE — 84156 ASSAY OF PROTEIN URINE: CPT

## 2020-07-27 PROCEDURE — 88175 CYTOPATH C/V AUTO FLUID REDO: CPT

## 2020-07-27 RX ORDER — BUTALBITAL, ACETAMINOPHEN AND CAFFEINE 50; 325; 40 MG/1; MG/1; MG/1
1 TABLET ORAL EVERY 4 HOURS PRN
Qty: 30 TABLET | Refills: 1 | Status: SHIPPED | OUTPATIENT
Start: 2020-07-27 | End: 2020-08-04 | Stop reason: SDUPTHER

## 2020-07-27 RX ORDER — ONDANSETRON 4 MG/1
4 TABLET, ORALLY DISINTEGRATING ORAL EVERY 8 HOURS PRN
Qty: 30 TABLET | Refills: 2 | Status: ON HOLD | OUTPATIENT
Start: 2020-07-27 | End: 2021-01-23 | Stop reason: HOSPADM

## 2020-07-27 NOTE — PROGRESS NOTES
36 yo  female with amenorrhea who presents to confirm her pregnancy.  Patient's last menstrual period was 2020 (approximate).    Obhx:   x 4 (two babies were given up for adoption)  TAB x 2   delivery with Fetal demise at 19 weeks in 2019    PMH: cHTN, h/o migraines  Surgery: none  Gynhx: denies h/o STDs; no h/o abl pap smears  Social: tobacco and marijuana use    Unofficial US: SIUP +FCA +FM    ROS:  GENERAL: Denies weight gain or weight loss. nausea  SKIN: Denies rash or lesions.   HEAD: Denies head injury or headache.   CHEST: Denies chest pain or shortness of breath.   CARDIOVASCULAR: Denies palpitations or left sided chest pain.   ABDOMEN: No abdominal pain, constipation, diarrhea, nausea, vomiting or rectal bleeding.   URINARY: No frequency, dysuria, hematuria, or burning on urination.  REPRODUCTIVE: nausea  BREASTS: denies pain, lumps, or nipple discharge.   HEMATOLOGIC: No easy bruisability or excessive bleeding.   MUSCULOSKELETAL: Denies joint pain or swelling.   NEUROLOGIC: Denies syncope or weakness.   PSYCHIATRIC: Denies depression, anxiety or mood swings.       PE:   Vitals: /64   Wt 65.4 kg (144 lb 2.9 oz)   LMP 2020 (Approximate)   BMI 19.55 kg/m²   APPEARANCE: Well nourished, well developed, in no acute distress.  SKIN: Normal skin turgor, no lesions.  CHEST: Lungs clear to auscultation.  HEART: Regular rate and rhythm, no murmurs, rubs or gallops.  ABDOMEN: Soft. No tenderness or masses. No hepatosplenomegaly. No hernias.  BREASTS: Symmetrical, no skin changes or visible lesions. No palpable masses, nipple discharge or adenopathy bilaterally.  PELVIC: Normal external female genitalia without lesions. Normal hair distribution. Adequate perineal body, normal urethral meatus. Vagina moist and well rugated without lesions or discharge. Cervix pink and without lesions. No significant cystocele or rectocele. Bimanual exam showed uterus normal size, shape, position,  mobile and nontender. Adnexa without masses or tenderness. Urethra and bladder normal.    Unofficial US: SIUP, +FCA, +FM    1) Amenorrhea  Office UPT positive  Dating scan ordered  Pap collected  New OB labs ordered  rx for zofran prn nausea  rx for fioricet provided    2) PP  Wants BTL    F/u in 1 wk    stephane platt MD

## 2020-07-28 LAB
HBV SURFACE AG SERPL QL IA: NEGATIVE
HIV 1+2 AB+HIV1 P24 AG SERPL QL IA: NEGATIVE
RPR SER QL: NORMAL
RUBV IGG SER-ACNC: 5.8 IU/ML
RUBV IGG SER-IMP: ABNORMAL
VARICELLA INTERPRETATION: POSITIVE
VARICELLA ZOSTER IGG: 2.98 ISR (ref 0–0.9)

## 2020-07-29 LAB
BACTERIA UR CULT: NORMAL
HGB A2 MFR BLD HPLC: 3 % (ref 2.2–3.2)
HGB FRACT BLD ELPH-IMP: NORMAL
HGB FRACT BLD ELPH-IMP: NORMAL

## 2020-07-30 LAB
C TRACH DNA SPEC QL NAA+PROBE: NOT DETECTED
CFTR MUT ANL BLD/T: NORMAL
N GONORRHOEA DNA SPEC QL NAA+PROBE: NOT DETECTED

## 2020-08-04 DIAGNOSIS — G44.009 CLUSTER HEADACHE, NOT INTRACTABLE, UNSPECIFIED CHRONICITY PATTERN: ICD-10-CM

## 2020-08-04 DIAGNOSIS — N91.2 AMENORRHEA: ICD-10-CM

## 2020-08-07 ENCOUNTER — PROCEDURE VISIT (OUTPATIENT)
Dept: OBSTETRICS AND GYNECOLOGY | Facility: CLINIC | Age: 35
End: 2020-08-07
Payer: MEDICAID

## 2020-08-07 ENCOUNTER — ROUTINE PRENATAL (OUTPATIENT)
Dept: OBSTETRICS AND GYNECOLOGY | Facility: CLINIC | Age: 35
End: 2020-08-07
Payer: MEDICAID

## 2020-08-07 VITALS
DIASTOLIC BLOOD PRESSURE: 64 MMHG | SYSTOLIC BLOOD PRESSURE: 108 MMHG | WEIGHT: 143.75 LBS | BODY MASS INDEX: 19.49 KG/M2

## 2020-08-07 DIAGNOSIS — Z3A.14 14 WEEKS GESTATION OF PREGNANCY: ICD-10-CM

## 2020-08-07 DIAGNOSIS — N91.2 AMENORRHEA: ICD-10-CM

## 2020-08-07 DIAGNOSIS — Z34.81 ENCOUNTER FOR SUPERVISION OF OTHER NORMAL PREGNANCY IN FIRST TRIMESTER: Primary | ICD-10-CM

## 2020-08-07 DIAGNOSIS — Z34.90 PREGNANCY WITH FETUS OF UNKNOWN GESTATIONAL AGE: ICD-10-CM

## 2020-08-07 PROCEDURE — 99213 OFFICE O/P EST LOW 20 MIN: CPT | Mod: TH,S$PBB,, | Performed by: OBSTETRICS & GYNECOLOGY

## 2020-08-07 PROCEDURE — 99999 PR PBB SHADOW E&M-EST. PATIENT-LVL II: ICD-10-PCS | Mod: PBBFAC,,, | Performed by: OBSTETRICS & GYNECOLOGY

## 2020-08-07 PROCEDURE — 76816 OB US FOLLOW-UP PER FETUS: CPT | Mod: 26,S$PBB,, | Performed by: OBSTETRICS & GYNECOLOGY

## 2020-08-07 PROCEDURE — 99999 PR PBB SHADOW E&M-EST. PATIENT-LVL II: CPT | Mod: PBBFAC,,, | Performed by: OBSTETRICS & GYNECOLOGY

## 2020-08-07 PROCEDURE — 99212 OFFICE O/P EST SF 10 MIN: CPT | Mod: PBBFAC,TH,PO,25 | Performed by: OBSTETRICS & GYNECOLOGY

## 2020-08-07 PROCEDURE — 76816 OB US FOLLOW-UP PER FETUS: CPT | Mod: PBBFAC,PO | Performed by: OBSTETRICS & GYNECOLOGY

## 2020-08-07 PROCEDURE — 99213 PR OFFICE/OUTPT VISIT, EST, LEVL III, 20-29 MIN: ICD-10-PCS | Mod: TH,S$PBB,, | Performed by: OBSTETRICS & GYNECOLOGY

## 2020-08-07 PROCEDURE — 76816 PR  US,PREGNANT UTERUS,F/U,TRANSABD APP: ICD-10-PCS | Mod: 26,S$PBB,, | Performed by: OBSTETRICS & GYNECOLOGY

## 2020-08-07 NOTE — PROGRESS NOTES
36 yo  female at 14.1 wks by 14.1 wk sono (EDC 2021) who presents for f/u OB visit.    Obhx:   x 4 (two babies were given up for adoption)  TAB x 2   delivery with Fetal demise at 19 weeks in 2019    1) SIUP  -s/p official dating scan  -Rh positive  -pap pending  -zofran prn nausea    2) cHTN: baseline PIH wnl  No meds    3) PP  Wants BTL  RNI    F/u in 4 wk, discuss genetic testing    stephane platt MD

## 2020-08-11 LAB
FINAL PATHOLOGIC DIAGNOSIS: NORMAL
Lab: NORMAL

## 2020-09-11 RX ORDER — BUTALBITAL, ACETAMINOPHEN AND CAFFEINE 50; 325; 40 MG/1; MG/1; MG/1
1 TABLET ORAL EVERY 4 HOURS PRN
Qty: 30 TABLET | Refills: 1 | Status: SHIPPED | OUTPATIENT
Start: 2020-09-11 | End: 2020-10-11

## 2020-09-23 ENCOUNTER — TELEPHONE (OUTPATIENT)
Dept: OBSTETRICS AND GYNECOLOGY | Facility: CLINIC | Age: 35
End: 2020-09-23

## 2020-09-23 ENCOUNTER — PATIENT MESSAGE (OUTPATIENT)
Dept: OBSTETRICS AND GYNECOLOGY | Facility: CLINIC | Age: 35
End: 2020-09-23

## 2020-09-23 DIAGNOSIS — Z34.82 ENCOUNTER FOR SUPERVISION OF OTHER NORMAL PREGNANCY IN SECOND TRIMESTER: Primary | ICD-10-CM

## 2020-10-05 ENCOUNTER — ROUTINE PRENATAL (OUTPATIENT)
Dept: OBSTETRICS AND GYNECOLOGY | Facility: CLINIC | Age: 35
End: 2020-10-05
Payer: MEDICAID

## 2020-10-05 ENCOUNTER — PROCEDURE VISIT (OUTPATIENT)
Dept: OBSTETRICS AND GYNECOLOGY | Facility: CLINIC | Age: 35
End: 2020-10-05
Payer: MEDICAID

## 2020-10-05 VITALS
BODY MASS INDEX: 20.63 KG/M2 | DIASTOLIC BLOOD PRESSURE: 70 MMHG | WEIGHT: 152.13 LBS | SYSTOLIC BLOOD PRESSURE: 110 MMHG

## 2020-10-05 DIAGNOSIS — Z3A.22 22 WEEKS GESTATION OF PREGNANCY: ICD-10-CM

## 2020-10-05 DIAGNOSIS — Z34.82 ENCOUNTER FOR SUPERVISION OF OTHER NORMAL PREGNANCY IN SECOND TRIMESTER: ICD-10-CM

## 2020-10-05 DIAGNOSIS — Z34.82 ENCOUNTER FOR SUPERVISION OF OTHER NORMAL PREGNANCY IN SECOND TRIMESTER: Primary | ICD-10-CM

## 2020-10-05 PROCEDURE — 76817 TRANSVAGINAL US OBSTETRIC: CPT | Mod: PBBFAC,PO | Performed by: OBSTETRICS & GYNECOLOGY

## 2020-10-05 PROCEDURE — 76817 PR US, OB, TRANSVAG APPROACH: ICD-10-PCS | Mod: 26,S$PBB,, | Performed by: OBSTETRICS & GYNECOLOGY

## 2020-10-05 PROCEDURE — 76805 OB US >/= 14 WKS SNGL FETUS: CPT | Mod: PBBFAC,PO | Performed by: OBSTETRICS & GYNECOLOGY

## 2020-10-05 PROCEDURE — 76805 OB US >/= 14 WKS SNGL FETUS: CPT | Mod: 26,S$PBB,, | Performed by: OBSTETRICS & GYNECOLOGY

## 2020-10-05 PROCEDURE — 99213 OFFICE O/P EST LOW 20 MIN: CPT | Mod: PBBFAC,TH,PO,25 | Performed by: OBSTETRICS & GYNECOLOGY

## 2020-10-05 PROCEDURE — 99999 PR PBB SHADOW E&M-EST. PATIENT-LVL III: CPT | Mod: PBBFAC,,, | Performed by: OBSTETRICS & GYNECOLOGY

## 2020-10-05 PROCEDURE — 99213 PR OFFICE/OUTPT VISIT, EST, LEVL III, 20-29 MIN: ICD-10-PCS | Mod: TH,S$PBB,, | Performed by: OBSTETRICS & GYNECOLOGY

## 2020-10-05 PROCEDURE — 99999 PR PBB SHADOW E&M-EST. PATIENT-LVL III: ICD-10-PCS | Mod: PBBFAC,,, | Performed by: OBSTETRICS & GYNECOLOGY

## 2020-10-05 PROCEDURE — 76805 PR US, OB 14+WKS, TRANSABD, SINGLE GESTATION: ICD-10-PCS | Mod: 26,S$PBB,, | Performed by: OBSTETRICS & GYNECOLOGY

## 2020-10-05 PROCEDURE — 99213 OFFICE O/P EST LOW 20 MIN: CPT | Mod: TH,S$PBB,, | Performed by: OBSTETRICS & GYNECOLOGY

## 2020-10-05 PROCEDURE — 76817 TRANSVAGINAL US OBSTETRIC: CPT | Mod: 26,S$PBB,, | Performed by: OBSTETRICS & GYNECOLOGY

## 2020-10-16 ENCOUNTER — TELEPHONE (OUTPATIENT)
Dept: OBSTETRICS AND GYNECOLOGY | Facility: CLINIC | Age: 35
End: 2020-10-16

## 2020-10-16 NOTE — TELEPHONE ENCOUNTER
----- Message from Thalia Wynne sent at 10/16/2020  4:15 PM CDT -----  Contact: [phone]  Pt spouse calling to ask some questions about the pt being on bed rest. He would like to know if its antthing special that she need

## 2020-11-02 ENCOUNTER — PATIENT MESSAGE (OUTPATIENT)
Dept: OBSTETRICS AND GYNECOLOGY | Facility: CLINIC | Age: 35
End: 2020-11-02

## 2020-11-02 ENCOUNTER — TELEPHONE (OUTPATIENT)
Dept: OBSTETRICS AND GYNECOLOGY | Facility: CLINIC | Age: 35
End: 2020-11-02

## 2020-11-06 ENCOUNTER — ROUTINE PRENATAL (OUTPATIENT)
Dept: OBSTETRICS AND GYNECOLOGY | Facility: CLINIC | Age: 35
End: 2020-11-06
Payer: MEDICAID

## 2020-11-06 ENCOUNTER — PROCEDURE VISIT (OUTPATIENT)
Dept: OBSTETRICS AND GYNECOLOGY | Facility: CLINIC | Age: 35
End: 2020-11-06
Payer: MEDICAID

## 2020-11-06 VITALS
DIASTOLIC BLOOD PRESSURE: 60 MMHG | SYSTOLIC BLOOD PRESSURE: 110 MMHG | WEIGHT: 156.75 LBS | BODY MASS INDEX: 21.26 KG/M2

## 2020-11-06 DIAGNOSIS — R51.9 GENERALIZED HEADACHES: ICD-10-CM

## 2020-11-06 DIAGNOSIS — Z3A.27 27 WEEKS GESTATION OF PREGNANCY: ICD-10-CM

## 2020-11-06 DIAGNOSIS — Z34.82 ENCOUNTER FOR SUPERVISION OF OTHER NORMAL PREGNANCY IN SECOND TRIMESTER: Primary | ICD-10-CM

## 2020-11-06 DIAGNOSIS — Z34.82 ENCOUNTER FOR SUPERVISION OF OTHER NORMAL PREGNANCY IN SECOND TRIMESTER: ICD-10-CM

## 2020-11-06 PROCEDURE — 99999 PR PBB SHADOW E&M-EST. PATIENT-LVL II: ICD-10-PCS | Mod: PBBFAC,,, | Performed by: OBSTETRICS & GYNECOLOGY

## 2020-11-06 PROCEDURE — 99999 PR PBB SHADOW E&M-EST. PATIENT-LVL II: CPT | Mod: PBBFAC,,, | Performed by: OBSTETRICS & GYNECOLOGY

## 2020-11-06 PROCEDURE — 76816 OB US FOLLOW-UP PER FETUS: CPT | Mod: 26,S$PBB,, | Performed by: OBSTETRICS & GYNECOLOGY

## 2020-11-06 PROCEDURE — 76816 PR  US,PREGNANT UTERUS,F/U,TRANSABD APP: ICD-10-PCS | Mod: 26,S$PBB,, | Performed by: OBSTETRICS & GYNECOLOGY

## 2020-11-06 PROCEDURE — 99212 OFFICE O/P EST SF 10 MIN: CPT | Mod: PBBFAC,TH,PO,25 | Performed by: OBSTETRICS & GYNECOLOGY

## 2020-11-06 PROCEDURE — 99213 OFFICE O/P EST LOW 20 MIN: CPT | Mod: TH,S$PBB,, | Performed by: OBSTETRICS & GYNECOLOGY

## 2020-11-06 PROCEDURE — 99213 PR OFFICE/OUTPT VISIT, EST, LEVL III, 20-29 MIN: ICD-10-PCS | Mod: TH,S$PBB,, | Performed by: OBSTETRICS & GYNECOLOGY

## 2020-11-06 PROCEDURE — 76816 OB US FOLLOW-UP PER FETUS: CPT | Mod: PBBFAC,PO | Performed by: OBSTETRICS & GYNECOLOGY

## 2020-11-06 RX ORDER — BUTALBITAL, ACETAMINOPHEN AND CAFFEINE 50; 325; 40 MG/1; MG/1; MG/1
1 TABLET ORAL EVERY 4 HOURS PRN
Qty: 30 TABLET | Refills: 1 | Status: SHIPPED | OUTPATIENT
Start: 2020-11-06 | End: 2021-01-14

## 2020-11-06 NOTE — PROGRESS NOTES
Great fetal movement.  Growth scaon on  shows AC 7%    36 yo  female at 27.1 wks by 14.1 wk sono (EDC 2021) who presents for f/u OB visit.    Obhx:   x 4 (two babies were given up for adoption)  TAB x 2   delivery with Fetal demise at 19 weeks in 2019    1) SIUP (it's a girl)  -normal anatomy    -Rh positive  -pap normal  -too late to discuss genetic testing  -consents for vag/blood/BTL signed on 10/5/2020    2) placenta previa ( resolved?)    3) cHTN: baseline PIH wnl  No meds    4) PP  Wants BTL  RNI    F/u in 4 wks, growth, 1 hr gtt ordered  May need NSTs are Ob visits q 2 weeks   stephane platt MD

## 2020-11-11 ENCOUNTER — LAB VISIT (OUTPATIENT)
Dept: LAB | Facility: HOSPITAL | Age: 35
End: 2020-11-11
Attending: OBSTETRICS & GYNECOLOGY
Payer: MEDICAID

## 2020-11-11 DIAGNOSIS — Z34.82 ENCOUNTER FOR SUPERVISION OF OTHER NORMAL PREGNANCY IN SECOND TRIMESTER: ICD-10-CM

## 2020-11-11 DIAGNOSIS — Z3A.27 27 WEEKS GESTATION OF PREGNANCY: ICD-10-CM

## 2020-11-11 LAB
BASOPHILS # BLD AUTO: 0.05 K/UL (ref 0–0.2)
BASOPHILS NFR BLD: 0.4 % (ref 0–1.9)
DIFFERENTIAL METHOD: ABNORMAL
EOSINOPHIL # BLD AUTO: 0.2 K/UL (ref 0–0.5)
EOSINOPHIL NFR BLD: 1.6 % (ref 0–8)
ERYTHROCYTE [DISTWIDTH] IN BLOOD BY AUTOMATED COUNT: 13.5 % (ref 11.5–14.5)
GLUCOSE SERPL-MCNC: 129 MG/DL (ref 70–140)
HCT VFR BLD AUTO: 31.8 % (ref 37–48.5)
HGB BLD-MCNC: 10.3 G/DL (ref 12–16)
IMM GRANULOCYTES # BLD AUTO: 0.12 K/UL (ref 0–0.04)
IMM GRANULOCYTES NFR BLD AUTO: 1 % (ref 0–0.5)
LYMPHOCYTES # BLD AUTO: 2.3 K/UL (ref 1–4.8)
LYMPHOCYTES NFR BLD: 19.6 % (ref 18–48)
MCH RBC QN AUTO: 29.7 PG (ref 27–31)
MCHC RBC AUTO-ENTMCNC: 32.4 G/DL (ref 32–36)
MCV RBC AUTO: 92 FL (ref 82–98)
MONOCYTES # BLD AUTO: 0.6 K/UL (ref 0.3–1)
MONOCYTES NFR BLD: 5 % (ref 4–15)
NEUTROPHILS # BLD AUTO: 8.5 K/UL (ref 1.8–7.7)
NEUTROPHILS NFR BLD: 72.4 % (ref 38–73)
NRBC BLD-RTO: 0 /100 WBC
PLATELET # BLD AUTO: 325 K/UL (ref 150–350)
PMV BLD AUTO: 10.2 FL (ref 9.2–12.9)
RBC # BLD AUTO: 3.47 M/UL (ref 4–5.4)
WBC # BLD AUTO: 11.8 K/UL (ref 3.9–12.7)

## 2020-11-11 PROCEDURE — 82950 GLUCOSE TEST: CPT

## 2020-11-11 PROCEDURE — 85025 COMPLETE CBC W/AUTO DIFF WBC: CPT

## 2020-11-11 PROCEDURE — 36415 COLL VENOUS BLD VENIPUNCTURE: CPT

## 2020-12-02 ENCOUNTER — PATIENT MESSAGE (OUTPATIENT)
Dept: OBSTETRICS AND GYNECOLOGY | Facility: CLINIC | Age: 35
End: 2020-12-02

## 2020-12-08 ENCOUNTER — PROCEDURE VISIT (OUTPATIENT)
Dept: OBSTETRICS AND GYNECOLOGY | Facility: CLINIC | Age: 35
End: 2020-12-08
Payer: MEDICAID

## 2020-12-08 DIAGNOSIS — Z3A.31 31 WEEKS GESTATION OF PREGNANCY: ICD-10-CM

## 2020-12-08 DIAGNOSIS — Z34.82 ENCOUNTER FOR SUPERVISION OF OTHER NORMAL PREGNANCY IN SECOND TRIMESTER: ICD-10-CM

## 2020-12-08 DIAGNOSIS — Z34.83 ENCOUNTER FOR SUPERVISION OF OTHER NORMAL PREGNANCY IN THIRD TRIMESTER: Primary | ICD-10-CM

## 2020-12-08 DIAGNOSIS — Z3A.27 27 WEEKS GESTATION OF PREGNANCY: ICD-10-CM

## 2020-12-11 ENCOUNTER — TELEPHONE (OUTPATIENT)
Dept: OBSTETRICS AND GYNECOLOGY | Facility: CLINIC | Age: 35
End: 2020-12-11

## 2020-12-14 ENCOUNTER — TELEPHONE (OUTPATIENT)
Dept: OBSTETRICS AND GYNECOLOGY | Facility: CLINIC | Age: 35
End: 2020-12-14

## 2020-12-14 DIAGNOSIS — M79.10 MUSCLE PAIN: ICD-10-CM

## 2020-12-14 DIAGNOSIS — R50.9 FEVER: ICD-10-CM

## 2020-12-14 DIAGNOSIS — R50.81 FEVER IN OTHER DISEASES: Primary | ICD-10-CM

## 2020-12-14 NOTE — TELEPHONE ENCOUNTER
Called pt back and she informs that she has a fever of 100.9 and has fatigue. PT missed appt with us today. I advised pt get tested. Order was placed for covid-19 testing and scheduled. Please sign.

## 2020-12-15 ENCOUNTER — PATIENT MESSAGE (OUTPATIENT)
Dept: OBSTETRICS AND GYNECOLOGY | Facility: CLINIC | Age: 35
End: 2020-12-15

## 2020-12-16 ENCOUNTER — TELEPHONE (OUTPATIENT)
Dept: OBSTETRICS AND GYNECOLOGY | Facility: CLINIC | Age: 35
End: 2020-12-16

## 2020-12-16 NOTE — TELEPHONE ENCOUNTER
Called pt and she informs us that she no longer has a fever and would like to make it to her [facility] tomorrow. PT did try to go get the test but the line was to long and she had her kid with her and didn't want her kid to catch anything because people we coughing while waiting at urgent care. Dr platt was made aware.

## 2020-12-17 ENCOUNTER — ROUTINE PRENATAL (OUTPATIENT)
Dept: OBSTETRICS AND GYNECOLOGY | Facility: CLINIC | Age: 35
End: 2020-12-17
Payer: MEDICAID

## 2020-12-17 ENCOUNTER — PROCEDURE VISIT (OUTPATIENT)
Dept: OBSTETRICS AND GYNECOLOGY | Facility: CLINIC | Age: 35
End: 2020-12-17
Payer: MEDICAID

## 2020-12-17 VITALS
SYSTOLIC BLOOD PRESSURE: 100 MMHG | DIASTOLIC BLOOD PRESSURE: 60 MMHG | BODY MASS INDEX: 22.66 KG/M2 | WEIGHT: 167.13 LBS

## 2020-12-17 DIAGNOSIS — O36.5990 POOR FETAL GROWTH AFFECTING MANAGEMENT OF MOTHER, ANTEPARTUM, SINGLE OR UNSPECIFIED FETUS: ICD-10-CM

## 2020-12-17 DIAGNOSIS — Z3A.33 33 WEEKS GESTATION OF PREGNANCY: ICD-10-CM

## 2020-12-17 DIAGNOSIS — Z36.4 ANTENATAL SCREENING FOR FETAL GROWTH RETARDATION USING ULTRASONICS: ICD-10-CM

## 2020-12-17 DIAGNOSIS — Z34.83 ENCOUNTER FOR SUPERVISION OF OTHER NORMAL PREGNANCY IN THIRD TRIMESTER: Primary | ICD-10-CM

## 2020-12-17 DIAGNOSIS — Z34.83 ENCOUNTER FOR SUPERVISION OF OTHER NORMAL PREGNANCY IN THIRD TRIMESTER: ICD-10-CM

## 2020-12-17 DIAGNOSIS — O10.913 PRE-EXISTING HYPERTENSION DURING PREGNANCY IN THIRD TRIMESTER, UNSPECIFIED PRE-EXISTING HYPERTENSION TYPE: ICD-10-CM

## 2020-12-17 DIAGNOSIS — Z3A.31 31 WEEKS GESTATION OF PREGNANCY: ICD-10-CM

## 2020-12-17 PROCEDURE — 76819 FETAL BIOPHYS PROFIL W/O NST: CPT | Mod: PBBFAC,PO | Performed by: OBSTETRICS & GYNECOLOGY

## 2020-12-17 PROCEDURE — 76816 OB US FOLLOW-UP PER FETUS: CPT | Mod: PBBFAC,PO | Performed by: OBSTETRICS & GYNECOLOGY

## 2020-12-17 PROCEDURE — 76820 PR US, OB DOPPLER, FETAL UMBILICAL ARTERY ECHO: ICD-10-PCS | Mod: 26,S$PBB,, | Performed by: OBSTETRICS & GYNECOLOGY

## 2020-12-17 PROCEDURE — 76816 PR  US,PREGNANT UTERUS,F/U,TRANSABD APP: ICD-10-PCS | Mod: 26,S$PBB,, | Performed by: OBSTETRICS & GYNECOLOGY

## 2020-12-17 PROCEDURE — 76820 UMBILICAL ARTERY ECHO: CPT | Mod: PBBFAC,PO | Performed by: OBSTETRICS & GYNECOLOGY

## 2020-12-17 PROCEDURE — 99999 PR PBB SHADOW E&M-EST. PATIENT-LVL II: CPT | Mod: PBBFAC,,, | Performed by: OBSTETRICS & GYNECOLOGY

## 2020-12-17 PROCEDURE — 99213 OFFICE O/P EST LOW 20 MIN: CPT | Mod: TH,S$PBB,, | Performed by: OBSTETRICS & GYNECOLOGY

## 2020-12-17 PROCEDURE — 76816 OB US FOLLOW-UP PER FETUS: CPT | Mod: 26,S$PBB,, | Performed by: OBSTETRICS & GYNECOLOGY

## 2020-12-17 PROCEDURE — 76819 FETAL BIOPHYS PROFIL W/O NST: CPT | Mod: 26,S$PBB,, | Performed by: OBSTETRICS & GYNECOLOGY

## 2020-12-17 PROCEDURE — 76819 PR US, OB, FETAL BIOPHYSICAL, W/O NST: ICD-10-PCS | Mod: 26,S$PBB,, | Performed by: OBSTETRICS & GYNECOLOGY

## 2020-12-17 PROCEDURE — 99999 PR PBB SHADOW E&M-EST. PATIENT-LVL II: ICD-10-PCS | Mod: PBBFAC,,, | Performed by: OBSTETRICS & GYNECOLOGY

## 2020-12-17 PROCEDURE — 99212 OFFICE O/P EST SF 10 MIN: CPT | Mod: PBBFAC,TH,PO | Performed by: OBSTETRICS & GYNECOLOGY

## 2020-12-17 PROCEDURE — 76820 UMBILICAL ARTERY ECHO: CPT | Mod: 26,S$PBB,, | Performed by: OBSTETRICS & GYNECOLOGY

## 2020-12-17 PROCEDURE — 99213 PR OFFICE/OUTPT VISIT, EST, LEVL III, 20-29 MIN: ICD-10-PCS | Mod: TH,S$PBB,, | Performed by: OBSTETRICS & GYNECOLOGY

## 2020-12-17 NOTE — PROGRESS NOTES
Great fetal movement.  Growth scaon on  shows AC 7%    34 yo  female at 33 wks by 14.1 wk sono (EDC 2021) who presents for f/u OB visit.    Obhx:   x 4 (two babies were given up for adoption)  TAB x 2   delivery with Fetal demise at 19 weeks in 2019    1) SIUP (it's a girl)  -normal anatomy    -Rh positive  -pap normal  -too late to discuss genetic testing  -consents for vag/blood/BTL signed on 10/5/2020    2) placenta previa ( resolved)    3) cHTN: baseline PIH wnl  No meds    4) PP  Wants BTL  RNI    5) IUGR: NSTs biweeksteve platt MD

## 2020-12-21 ENCOUNTER — HOSPITAL ENCOUNTER (OUTPATIENT)
Dept: OBSTETRICS AND GYNECOLOGY | Facility: HOSPITAL | Age: 35
Discharge: HOME | End: 2020-12-21
Attending: OBSTETRICS & GYNECOLOGY
Payer: MEDICAID

## 2020-12-21 DIAGNOSIS — O36.5930 POOR FETAL GROWTH AFFECTING MANAGEMENT OF MOTHER IN THIRD TRIMESTER, SINGLE OR UNSPECIFIED FETUS: Primary | ICD-10-CM

## 2020-12-21 PROCEDURE — 59025 FETAL NON-STRESS TEST: CPT

## 2020-12-22 ENCOUNTER — TELEPHONE (OUTPATIENT)
Dept: MATERNAL FETAL MEDICINE | Facility: CLINIC | Age: 35
End: 2020-12-22

## 2020-12-22 DIAGNOSIS — Z36.89 ENCOUNTER FOR ULTRASOUND TO ASSESS FETAL GROWTH: Primary | ICD-10-CM

## 2020-12-22 DIAGNOSIS — O44.40 ENCOUNTER FOR REPEAT ULTRASOUND FOR LOW LYING PLACENTA, ANTEPARTUM: ICD-10-CM

## 2020-12-28 ENCOUNTER — PATIENT MESSAGE (OUTPATIENT)
Dept: OBSTETRICS AND GYNECOLOGY | Facility: CLINIC | Age: 35
End: 2020-12-28

## 2020-12-28 ENCOUNTER — HOSPITAL ENCOUNTER (OUTPATIENT)
Dept: OBSTETRICS AND GYNECOLOGY | Facility: HOSPITAL | Age: 35
Discharge: HOME | End: 2020-12-28
Attending: OBSTETRICS & GYNECOLOGY
Payer: MEDICAID

## 2020-12-28 DIAGNOSIS — O36.5930 POOR FETAL GROWTH AFFECTING MANAGEMENT OF MOTHER IN THIRD TRIMESTER, SINGLE OR UNSPECIFIED FETUS: ICD-10-CM

## 2020-12-28 PROCEDURE — 59025 FETAL NON-STRESS TEST: CPT

## 2021-01-07 ENCOUNTER — HOSPITAL ENCOUNTER (OUTPATIENT)
Dept: OBSTETRICS AND GYNECOLOGY | Facility: HOSPITAL | Age: 36
Discharge: HOME | End: 2021-01-07
Attending: OBSTETRICS & GYNECOLOGY
Payer: MEDICAID

## 2021-01-07 ENCOUNTER — ROUTINE PRENATAL (OUTPATIENT)
Dept: OBSTETRICS AND GYNECOLOGY | Facility: CLINIC | Age: 36
End: 2021-01-07
Payer: MEDICAID

## 2021-01-07 VITALS
HEART RATE: 86 BPM | SYSTOLIC BLOOD PRESSURE: 125 MMHG | BODY MASS INDEX: 22.66 KG/M2 | WEIGHT: 167.13 LBS | DIASTOLIC BLOOD PRESSURE: 70 MMHG | DIASTOLIC BLOOD PRESSURE: 73 MMHG | SYSTOLIC BLOOD PRESSURE: 100 MMHG

## 2021-01-07 DIAGNOSIS — Z34.83 ENCOUNTER FOR SUPERVISION OF OTHER NORMAL PREGNANCY IN THIRD TRIMESTER: Primary | ICD-10-CM

## 2021-01-07 DIAGNOSIS — O36.5930 POOR FETAL GROWTH AFFECTING MANAGEMENT OF MOTHER IN THIRD TRIMESTER, SINGLE OR UNSPECIFIED FETUS: ICD-10-CM

## 2021-01-07 DIAGNOSIS — Z3A.36 36 WEEKS GESTATION OF PREGNANCY: ICD-10-CM

## 2021-01-07 PROCEDURE — 99213 OFFICE O/P EST LOW 20 MIN: CPT | Mod: TH,S$PBB,, | Performed by: OBSTETRICS & GYNECOLOGY

## 2021-01-07 PROCEDURE — 99213 PR OFFICE/OUTPT VISIT, EST, LEVL III, 20-29 MIN: ICD-10-PCS | Mod: TH,S$PBB,, | Performed by: OBSTETRICS & GYNECOLOGY

## 2021-01-07 PROCEDURE — 59025 FETAL NON-STRESS TEST: CPT

## 2021-01-07 PROCEDURE — 99999 PR PBB SHADOW E&M-EST. PATIENT-LVL II: ICD-10-PCS | Mod: PBBFAC,,, | Performed by: OBSTETRICS & GYNECOLOGY

## 2021-01-07 PROCEDURE — 99212 OFFICE O/P EST SF 10 MIN: CPT | Mod: PBBFAC,25,TH,PO | Performed by: OBSTETRICS & GYNECOLOGY

## 2021-01-07 PROCEDURE — 87081 CULTURE SCREEN ONLY: CPT

## 2021-01-07 PROCEDURE — 99999 PR PBB SHADOW E&M-EST. PATIENT-LVL II: CPT | Mod: PBBFAC,,, | Performed by: OBSTETRICS & GYNECOLOGY

## 2021-01-11 ENCOUNTER — HOSPITAL ENCOUNTER (OUTPATIENT)
Dept: OBSTETRICS AND GYNECOLOGY | Facility: HOSPITAL | Age: 36
Discharge: HOME | End: 2021-01-11
Attending: OBSTETRICS & GYNECOLOGY
Payer: MEDICAID

## 2021-01-11 VITALS — DIASTOLIC BLOOD PRESSURE: 72 MMHG | HEART RATE: 85 BPM | SYSTOLIC BLOOD PRESSURE: 114 MMHG

## 2021-01-11 DIAGNOSIS — O36.5930 POOR FETAL GROWTH AFFECTING MANAGEMENT OF MOTHER IN THIRD TRIMESTER, SINGLE OR UNSPECIFIED FETUS: ICD-10-CM

## 2021-01-11 LAB — BACTERIA SPEC AEROBE CULT: NORMAL

## 2021-01-11 PROCEDURE — 59025 FETAL NON-STRESS TEST: CPT

## 2021-01-14 ENCOUNTER — PROCEDURE VISIT (OUTPATIENT)
Dept: MATERNAL FETAL MEDICINE | Facility: HOSPITAL | Age: 36
End: 2021-01-14
Payer: MEDICAID

## 2021-01-14 VITALS
BODY MASS INDEX: 23.38 KG/M2 | SYSTOLIC BLOOD PRESSURE: 110 MMHG | WEIGHT: 172.38 LBS | DIASTOLIC BLOOD PRESSURE: 70 MMHG

## 2021-01-14 DIAGNOSIS — Z36.89 ENCOUNTER FOR ULTRASOUND TO ASSESS FETAL GROWTH: ICD-10-CM

## 2021-01-14 DIAGNOSIS — Z3A.37 37 WEEKS GESTATION OF PREGNANCY: ICD-10-CM

## 2021-01-14 DIAGNOSIS — O44.40 ENCOUNTER FOR REPEAT ULTRASOUND FOR LOW LYING PLACENTA, ANTEPARTUM: ICD-10-CM

## 2021-01-14 DIAGNOSIS — O44.43 LOW-LYING PLACENTA IN THIRD TRIMESTER: ICD-10-CM

## 2021-01-14 DIAGNOSIS — O10.913 PRE-EXISTING HYPERTENSION DURING PREGNANCY IN THIRD TRIMESTER, UNSPECIFIED PRE-EXISTING HYPERTENSION TYPE: ICD-10-CM

## 2021-01-14 PROCEDURE — 76819 FETAL BIOPHYS PROFIL W/O NST: CPT | Mod: 26,,, | Performed by: OBSTETRICS & GYNECOLOGY

## 2021-01-14 PROCEDURE — 76816 OB US FOLLOW-UP PER FETUS: CPT | Mod: 26,,, | Performed by: OBSTETRICS & GYNECOLOGY

## 2021-01-14 PROCEDURE — 99499 UNLISTED E&M SERVICE: CPT | Mod: ,,, | Performed by: OBSTETRICS & GYNECOLOGY

## 2021-01-14 PROCEDURE — 76817 TRANSVAGINAL US OBSTETRIC: CPT

## 2021-01-14 PROCEDURE — 76816 PR  US,PREGNANT UTERUS,F/U,TRANSABD APP: ICD-10-PCS | Mod: 26,,, | Performed by: OBSTETRICS & GYNECOLOGY

## 2021-01-14 PROCEDURE — 76816 OB US FOLLOW-UP PER FETUS: CPT

## 2021-01-14 PROCEDURE — 76817 PR US, OB, TRANSVAG APPROACH: ICD-10-PCS | Mod: 26,,, | Performed by: OBSTETRICS & GYNECOLOGY

## 2021-01-14 PROCEDURE — 76819 PR US, OB, FETAL BIOPHYSICAL, W/O NST: ICD-10-PCS | Mod: 26,,, | Performed by: OBSTETRICS & GYNECOLOGY

## 2021-01-14 PROCEDURE — 76817 TRANSVAGINAL US OBSTETRIC: CPT | Mod: 26,,, | Performed by: OBSTETRICS & GYNECOLOGY

## 2021-01-14 PROCEDURE — 99499 NO LOS: ICD-10-PCS | Mod: ,,, | Performed by: OBSTETRICS & GYNECOLOGY

## 2021-01-14 PROCEDURE — 76819 FETAL BIOPHYS PROFIL W/O NST: CPT

## 2021-01-21 PROBLEM — O10.919 CHRONIC HYPERTENSION AFFECTING PREGNANCY: Status: ACTIVE | Noted: 2021-01-21

## 2021-01-22 ENCOUNTER — HOSPITAL ENCOUNTER (INPATIENT)
Facility: HOSPITAL | Age: 36
LOS: 3 days | Discharge: HOME | End: 2021-01-25
Attending: OBSTETRICS & GYNECOLOGY | Admitting: OBSTETRICS & GYNECOLOGY
Payer: MEDICAID

## 2021-01-22 ENCOUNTER — ANESTHESIA EVENT (OUTPATIENT)
Dept: OBSTETRICS AND GYNECOLOGY | Facility: HOSPITAL | Age: 36
End: 2021-01-22
Payer: MEDICAID

## 2021-01-22 ENCOUNTER — CLINICAL SUPPORT (OUTPATIENT)
Dept: SMOKING CESSATION | Facility: CLINIC | Age: 36
End: 2021-01-22

## 2021-01-22 ENCOUNTER — ANESTHESIA (OUTPATIENT)
Dept: OBSTETRICS AND GYNECOLOGY | Facility: HOSPITAL | Age: 36
End: 2021-01-22
Payer: MEDICAID

## 2021-01-22 DIAGNOSIS — F17.210 CIGARETTE SMOKER: Primary | ICD-10-CM

## 2021-01-22 DIAGNOSIS — Z34.90 PREGNANCY: ICD-10-CM

## 2021-01-22 PROBLEM — Z3A.38 38 WEEKS GESTATION OF PREGNANCY: Status: ACTIVE | Noted: 2021-01-22

## 2021-01-22 LAB
ABO + RH BLD: NORMAL
ALBUMIN SERPL BCP-MCNC: 2.5 G/DL (ref 3.5–5.2)
ALP SERPL-CCNC: 161 U/L (ref 55–135)
ALT SERPL W/O P-5'-P-CCNC: 9 U/L (ref 10–44)
ANION GAP SERPL CALC-SCNC: 12 MMOL/L (ref 8–16)
AST SERPL-CCNC: 18 U/L (ref 10–40)
BASOPHILS # BLD AUTO: 0.06 K/UL (ref 0–0.2)
BASOPHILS NFR BLD: 0.5 % (ref 0–1.9)
BILIRUB SERPL-MCNC: 0.3 MG/DL (ref 0.1–1)
BLD GP AB SCN CELLS X3 SERPL QL: NORMAL
BUN SERPL-MCNC: 6 MG/DL (ref 6–20)
CALCIUM SERPL-MCNC: 8.2 MG/DL (ref 8.7–10.5)
CHLORIDE SERPL-SCNC: 102 MMOL/L (ref 95–110)
CO2 SERPL-SCNC: 21 MMOL/L (ref 23–29)
CREAT SERPL-MCNC: 0.6 MG/DL (ref 0.5–1.4)
DIFFERENTIAL METHOD: ABNORMAL
EOSINOPHIL # BLD AUTO: 0.1 K/UL (ref 0–0.5)
EOSINOPHIL NFR BLD: 0.7 % (ref 0–8)
ERYTHROCYTE [DISTWIDTH] IN BLOOD BY AUTOMATED COUNT: 13.7 % (ref 11.5–14.5)
EST. GFR  (AFRICAN AMERICAN): >60 ML/MIN/1.73 M^2
EST. GFR  (NON AFRICAN AMERICAN): >60 ML/MIN/1.73 M^2
GLUCOSE SERPL-MCNC: 96 MG/DL (ref 70–110)
HBV SURFACE AG SERPL QL IA: NEGATIVE
HCT VFR BLD AUTO: 30.1 % (ref 37–48.5)
HGB BLD-MCNC: 10.3 G/DL (ref 12–16)
HIV 1+2 AB+HIV1 P24 AG SERPL QL IA: NEGATIVE
HIV1+2 IGG SERPL QL IA.RAPID: NORMAL
IMM GRANULOCYTES # BLD AUTO: 0.15 K/UL (ref 0–0.04)
IMM GRANULOCYTES NFR BLD AUTO: 1.2 % (ref 0–0.5)
LYMPHOCYTES # BLD AUTO: 2.6 K/UL (ref 1–4.8)
LYMPHOCYTES NFR BLD: 20.4 % (ref 18–48)
MCH RBC QN AUTO: 29.9 PG (ref 27–31)
MCHC RBC AUTO-ENTMCNC: 34.2 G/DL (ref 32–36)
MCV RBC AUTO: 87 FL (ref 82–98)
MONOCYTES # BLD AUTO: 0.8 K/UL (ref 0.3–1)
MONOCYTES NFR BLD: 6 % (ref 4–15)
NEUTROPHILS # BLD AUTO: 9.3 K/UL (ref 1.8–7.7)
NEUTROPHILS NFR BLD: 71.2 % (ref 38–73)
NRBC BLD-RTO: 0 /100 WBC
PLATELET # BLD AUTO: 320 K/UL (ref 150–350)
PMV BLD AUTO: 10.7 FL (ref 9.2–12.9)
POTASSIUM SERPL-SCNC: 4.2 MMOL/L (ref 3.5–5.1)
PROT SERPL-MCNC: 6.3 G/DL (ref 6–8.4)
RBC # BLD AUTO: 3.45 M/UL (ref 4–5.4)
RPR SER QL: NORMAL
SARS-COV-2 RDRP RESP QL NAA+PROBE: NEGATIVE
SODIUM SERPL-SCNC: 135 MMOL/L (ref 136–145)
WBC # BLD AUTO: 12.97 K/UL (ref 3.9–12.7)

## 2021-01-22 PROCEDURE — 86900 BLOOD TYPING SEROLOGIC ABO: CPT

## 2021-01-22 PROCEDURE — 85025 COMPLETE CBC W/AUTO DIFF WBC: CPT

## 2021-01-22 PROCEDURE — 25000003 PHARM REV CODE 250: Performed by: OBSTETRICS & GYNECOLOGY

## 2021-01-22 PROCEDURE — 63600175 PHARM REV CODE 636 W HCPCS: Performed by: OBSTETRICS & GYNECOLOGY

## 2021-01-22 PROCEDURE — 99406 PT REFUSED TOBACCO CESSATION: ICD-10-PCS | Mod: S$GLB,,,

## 2021-01-22 PROCEDURE — 86703 HIV-1/HIV-2 1 RESULT ANTBDY: CPT

## 2021-01-22 PROCEDURE — 59409 OBSTETRICAL CARE: CPT | Mod: AT,,, | Performed by: OBSTETRICS & GYNECOLOGY

## 2021-01-22 PROCEDURE — 90715 TDAP VACCINE 7 YRS/> IM: CPT | Performed by: OBSTETRICS & GYNECOLOGY

## 2021-01-22 PROCEDURE — 27800516 HC TRAY, EPIDURAL COMBO: Performed by: ANESTHESIOLOGY

## 2021-01-22 PROCEDURE — U0002 COVID-19 LAB TEST NON-CDC: HCPCS

## 2021-01-22 PROCEDURE — 99406 BEHAV CHNG SMOKING 3-10 MIN: CPT | Mod: S$GLB,,,

## 2021-01-22 PROCEDURE — 36415 COLL VENOUS BLD VENIPUNCTURE: CPT

## 2021-01-22 PROCEDURE — 90471 IMMUNIZATION ADMIN: CPT | Performed by: OBSTETRICS & GYNECOLOGY

## 2021-01-22 PROCEDURE — 25000003 PHARM REV CODE 250: Performed by: STUDENT IN AN ORGANIZED HEALTH CARE EDUCATION/TRAINING PROGRAM

## 2021-01-22 PROCEDURE — 86703 HIV-1/HIV-2 1 RESULT ANTBDY: CPT | Mod: 91

## 2021-01-22 PROCEDURE — 59409 PR OBSTETRICAL CARE,VAG DELIV ONLY: ICD-10-PCS | Mod: AT,,, | Performed by: OBSTETRICS & GYNECOLOGY

## 2021-01-22 PROCEDURE — 80053 COMPREHEN METABOLIC PANEL: CPT

## 2021-01-22 PROCEDURE — 87340 HEPATITIS B SURFACE AG IA: CPT

## 2021-01-22 PROCEDURE — 62326 NJX INTERLAMINAR LMBR/SAC: CPT | Performed by: STUDENT IN AN ORGANIZED HEALTH CARE EDUCATION/TRAINING PROGRAM

## 2021-01-22 PROCEDURE — 11000001 HC ACUTE MED/SURG PRIVATE ROOM

## 2021-01-22 PROCEDURE — 72200004 HC VAGINAL DELIVERY LEVEL I

## 2021-01-22 PROCEDURE — 86592 SYPHILIS TEST NON-TREP QUAL: CPT

## 2021-01-22 PROCEDURE — 51702 INSERT TEMP BLADDER CATH: CPT

## 2021-01-22 RX ORDER — SODIUM CHLORIDE 9 MG/ML
INJECTION, SOLUTION INTRAVENOUS
Status: DISCONTINUED | OUTPATIENT
Start: 2021-01-22 | End: 2021-01-22

## 2021-01-22 RX ORDER — ONDANSETRON 8 MG/1
8 TABLET, ORALLY DISINTEGRATING ORAL EVERY 8 HOURS PRN
Status: DISCONTINUED | OUTPATIENT
Start: 2021-01-22 | End: 2021-01-22

## 2021-01-22 RX ORDER — SODIUM CHLORIDE 9 MG/ML
INJECTION, SOLUTION INTRAVENOUS CONTINUOUS
Status: DISCONTINUED | OUTPATIENT
Start: 2021-01-22 | End: 2021-01-22

## 2021-01-22 RX ORDER — OXYTOCIN/RINGER'S LACTATE 30/500 ML
95 PLASTIC BAG, INJECTION (ML) INTRAVENOUS ONCE
Status: DISCONTINUED | OUTPATIENT
Start: 2021-01-22 | End: 2021-01-25 | Stop reason: HOSPADM

## 2021-01-22 RX ORDER — OXYCODONE AND ACETAMINOPHEN 5; 325 MG/1; MG/1
1 TABLET ORAL EVERY 4 HOURS PRN
Status: DISCONTINUED | OUTPATIENT
Start: 2021-01-22 | End: 2021-01-25 | Stop reason: HOSPADM

## 2021-01-22 RX ORDER — CARBOPROST TROMETHAMINE 250 UG/ML
INJECTION, SOLUTION INTRAMUSCULAR
Status: DISCONTINUED
Start: 2021-01-22 | End: 2021-01-22 | Stop reason: WASHOUT

## 2021-01-22 RX ORDER — CALCIUM CARBONATE 200(500)MG
500 TABLET,CHEWABLE ORAL 3 TIMES DAILY PRN
Status: DISCONTINUED | OUTPATIENT
Start: 2021-01-22 | End: 2021-01-22

## 2021-01-22 RX ORDER — NAPROXEN 500 MG/1
500 TABLET ORAL EVERY 8 HOURS PRN
Status: DISCONTINUED | OUTPATIENT
Start: 2021-01-22 | End: 2021-01-25 | Stop reason: HOSPADM

## 2021-01-22 RX ORDER — PRENATAL WITH FERROUS FUM AND FOLIC ACID 3080; 920; 120; 400; 22; 1.84; 3; 20; 10; 1; 12; 200; 27; 25; 2 [IU]/1; [IU]/1; MG/1; [IU]/1; MG/1; MG/1; MG/1; MG/1; MG/1; MG/1; UG/1; MG/1; MG/1; MG/1; MG/1
1 TABLET ORAL DAILY
Status: DISCONTINUED | OUTPATIENT
Start: 2021-01-22 | End: 2021-01-25 | Stop reason: HOSPADM

## 2021-01-22 RX ORDER — SODIUM CHLORIDE, SODIUM LACTATE, POTASSIUM CHLORIDE, CALCIUM CHLORIDE 600; 310; 30; 20 MG/100ML; MG/100ML; MG/100ML; MG/100ML
INJECTION, SOLUTION INTRAVENOUS CONTINUOUS
Status: DISCONTINUED | OUTPATIENT
Start: 2021-01-22 | End: 2021-01-22

## 2021-01-22 RX ORDER — SIMETHICONE 80 MG
1 TABLET,CHEWABLE ORAL 4 TIMES DAILY PRN
Status: DISCONTINUED | OUTPATIENT
Start: 2021-01-22 | End: 2021-01-22

## 2021-01-22 RX ORDER — FENTANYL/ROPIVACAINE/NS/PF 2MCG/ML-.2
PLASTIC BAG, INJECTION (ML) INJECTION
Status: COMPLETED
Start: 2021-01-22 | End: 2021-01-22

## 2021-01-22 RX ORDER — OXYTOCIN/RINGER'S LACTATE 30/500 ML
95 PLASTIC BAG, INJECTION (ML) INTRAVENOUS ONCE
Status: DISCONTINUED | OUTPATIENT
Start: 2021-01-22 | End: 2021-01-22

## 2021-01-22 RX ORDER — DOCUSATE SODIUM 100 MG/1
200 CAPSULE, LIQUID FILLED ORAL 2 TIMES DAILY PRN
Status: DISCONTINUED | OUTPATIENT
Start: 2021-01-22 | End: 2021-01-25 | Stop reason: HOSPADM

## 2021-01-22 RX ORDER — MISOPROSTOL 200 UG/1
TABLET ORAL
Status: DISCONTINUED
Start: 2021-01-22 | End: 2021-01-22 | Stop reason: WASHOUT

## 2021-01-22 RX ORDER — FENTANYL/ROPIVACAINE/NS/PF 2MCG/ML-.2
PLASTIC BAG, INJECTION (ML) INJECTION CONTINUOUS PRN
Status: DISCONTINUED | OUTPATIENT
Start: 2021-01-22 | End: 2021-01-22

## 2021-01-22 RX ORDER — DIPHENHYDRAMINE HYDROCHLORIDE 50 MG/ML
25 INJECTION INTRAMUSCULAR; INTRAVENOUS EVERY 4 HOURS PRN
Status: DISCONTINUED | OUTPATIENT
Start: 2021-01-22 | End: 2021-01-25 | Stop reason: HOSPADM

## 2021-01-22 RX ORDER — SIMETHICONE 80 MG
1 TABLET,CHEWABLE ORAL EVERY 6 HOURS PRN
Status: DISCONTINUED | OUTPATIENT
Start: 2021-01-22 | End: 2021-01-25 | Stop reason: HOSPADM

## 2021-01-22 RX ORDER — METHYLERGONOVINE MALEATE 0.2 MG/ML
INJECTION INTRAVENOUS
Status: DISCONTINUED
Start: 2021-01-22 | End: 2021-01-22 | Stop reason: WASHOUT

## 2021-01-22 RX ORDER — ACETAMINOPHEN 325 MG/1
650 TABLET ORAL EVERY 6 HOURS PRN
Status: DISCONTINUED | OUTPATIENT
Start: 2021-01-22 | End: 2021-01-25 | Stop reason: HOSPADM

## 2021-01-22 RX ORDER — OXYTOCIN/RINGER'S LACTATE 30/500 ML
334 PLASTIC BAG, INJECTION (ML) INTRAVENOUS ONCE
Status: COMPLETED | OUTPATIENT
Start: 2021-01-22 | End: 2021-01-22

## 2021-01-22 RX ORDER — DIPHENHYDRAMINE HCL 25 MG
25 CAPSULE ORAL EVERY 4 HOURS PRN
Status: DISCONTINUED | OUTPATIENT
Start: 2021-01-22 | End: 2021-01-25 | Stop reason: HOSPADM

## 2021-01-22 RX ORDER — ONDANSETRON 8 MG/1
8 TABLET, ORALLY DISINTEGRATING ORAL EVERY 8 HOURS PRN
Status: DISCONTINUED | OUTPATIENT
Start: 2021-01-22 | End: 2021-01-25 | Stop reason: HOSPADM

## 2021-01-22 RX ORDER — TRANEXAMIC ACID 100 MG/ML
INJECTION, SOLUTION INTRAVENOUS
Status: DISCONTINUED
Start: 2021-01-22 | End: 2021-01-22 | Stop reason: WASHOUT

## 2021-01-22 RX ORDER — OXYTOCIN 10 [USP'U]/ML
INJECTION, SOLUTION INTRAMUSCULAR; INTRAVENOUS
Status: DISCONTINUED
Start: 2021-01-22 | End: 2021-01-22 | Stop reason: WASHOUT

## 2021-01-22 RX ADMIN — Medication 12 ML/HR: at 04:01

## 2021-01-22 RX ADMIN — PRENATAL VIT W/ FE FUMARATE-FA TAB 27-0.8 MG 1 TABLET: 27-0.8 TAB at 09:01

## 2021-01-22 RX ADMIN — NAPROXEN 500 MG: 500 TABLET ORAL at 09:01

## 2021-01-22 RX ADMIN — OXYCODONE HYDROCHLORIDE AND ACETAMINOPHEN 1 TABLET: 5; 325 TABLET ORAL at 09:01

## 2021-01-22 RX ADMIN — NAPROXEN 500 MG: 500 TABLET ORAL at 06:01

## 2021-01-22 RX ADMIN — OXYCODONE HYDROCHLORIDE AND ACETAMINOPHEN 1 TABLET: 5; 325 TABLET ORAL at 06:01

## 2021-01-22 RX ADMIN — Medication 334 MILLI-UNITS/MIN: at 06:01

## 2021-01-22 RX ADMIN — OXYCODONE HYDROCHLORIDE AND ACETAMINOPHEN 1 TABLET: 5; 325 TABLET ORAL at 02:01

## 2021-01-22 RX ADMIN — OXYCODONE HYDROCHLORIDE AND ACETAMINOPHEN 1 TABLET: 5; 325 TABLET ORAL at 11:01

## 2021-01-22 RX ADMIN — CLOSTRIDIUM TETANI TOXOID ANTIGEN (FORMALDEHYDE INACTIVATED), CORYNEBACTERIUM DIPHTHERIAE TOXOID ANTIGEN (FORMALDEHYDE INACTIVATED), BORDETELLA PERTUSSIS TOXOID ANTIGEN (GLUTARALDEHYDE INACTIVATED), BORDETELLA PERTUSSIS FILAMENTOUS HEMAGGLUTININ ANTIGEN (FORMALDEHYDE INACTIVATED), BORDETELLA PERTUSSIS PERTACTIN ANTIGEN, AND BORDETELLA PERTUSSIS FIMBRIAE 2/3 ANTIGEN 0.5 ML: 5; 2; 2.5; 5; 3; 5 INJECTION, SUSPENSION INTRAMUSCULAR at 11:01

## 2021-01-23 PROBLEM — Z28.39 RUBELLA NON-IMMUNE STATUS, ANTEPARTUM: Status: ACTIVE | Noted: 2021-01-23

## 2021-01-23 PROBLEM — Z34.93 NORMAL PREGNANCY IN THIRD TRIMESTER: Status: RESOLVED | Noted: 2017-02-07 | Resolved: 2021-01-23

## 2021-01-23 PROBLEM — D64.9 ANEMIA: Status: ACTIVE | Noted: 2021-01-23

## 2021-01-23 PROBLEM — O26.899 ABDOMINAL PAIN IN PREGNANCY: Status: RESOLVED | Noted: 2017-03-21 | Resolved: 2021-01-23

## 2021-01-23 PROBLEM — R10.9 ABDOMINAL PAIN IN PREGNANCY: Status: RESOLVED | Noted: 2017-03-21 | Resolved: 2021-01-23

## 2021-01-23 PROBLEM — O09.899 RUBELLA NON-IMMUNE STATUS, ANTEPARTUM: Status: ACTIVE | Noted: 2021-01-23

## 2021-01-23 PROBLEM — Z34.90 PREGNANCY: Status: RESOLVED | Noted: 2021-01-22 | Resolved: 2021-01-23

## 2021-01-23 PROBLEM — Z3A.38 38 WEEKS GESTATION OF PREGNANCY: Status: RESOLVED | Noted: 2021-01-22 | Resolved: 2021-01-23

## 2021-01-23 PROBLEM — O03.9 SPONTANEOUS ABORTION: Status: RESOLVED | Noted: 2019-08-05 | Resolved: 2021-01-23

## 2021-01-23 LAB
BASOPHILS # BLD AUTO: 0.09 K/UL (ref 0–0.2)
BASOPHILS NFR BLD: 0.6 % (ref 0–1.9)
DIFFERENTIAL METHOD: ABNORMAL
EOSINOPHIL # BLD AUTO: 0.3 K/UL (ref 0–0.5)
EOSINOPHIL NFR BLD: 2.2 % (ref 0–8)
ERYTHROCYTE [DISTWIDTH] IN BLOOD BY AUTOMATED COUNT: 13.7 % (ref 11.5–14.5)
HCT VFR BLD AUTO: 31.7 % (ref 37–48.5)
HGB BLD-MCNC: 10.3 G/DL (ref 12–16)
IMM GRANULOCYTES # BLD AUTO: 0.09 K/UL (ref 0–0.04)
IMM GRANULOCYTES NFR BLD AUTO: 0.6 % (ref 0–0.5)
LYMPHOCYTES # BLD AUTO: 2.9 K/UL (ref 1–4.8)
LYMPHOCYTES NFR BLD: 20.5 % (ref 18–48)
MCH RBC QN AUTO: 28.8 PG (ref 27–31)
MCHC RBC AUTO-ENTMCNC: 32.5 G/DL (ref 32–36)
MCV RBC AUTO: 89 FL (ref 82–98)
MONOCYTES # BLD AUTO: 0.8 K/UL (ref 0.3–1)
MONOCYTES NFR BLD: 6 % (ref 4–15)
NEUTROPHILS # BLD AUTO: 9.7 K/UL (ref 1.8–7.7)
NEUTROPHILS NFR BLD: 70.1 % (ref 38–73)
NRBC BLD-RTO: 0 /100 WBC
PLATELET # BLD AUTO: 336 K/UL (ref 150–350)
PMV BLD AUTO: 10.7 FL (ref 9.2–12.9)
RBC # BLD AUTO: 3.58 M/UL (ref 4–5.4)
WBC # BLD AUTO: 13.92 K/UL (ref 3.9–12.7)

## 2021-01-23 PROCEDURE — 25000003 PHARM REV CODE 250: Performed by: OBSTETRICS & GYNECOLOGY

## 2021-01-23 PROCEDURE — 99231 SBSQ HOSP IP/OBS SF/LOW 25: CPT | Mod: ,,, | Performed by: OBSTETRICS & GYNECOLOGY

## 2021-01-23 PROCEDURE — 85025 COMPLETE CBC W/AUTO DIFF WBC: CPT

## 2021-01-23 PROCEDURE — 11000001 HC ACUTE MED/SURG PRIVATE ROOM

## 2021-01-23 PROCEDURE — S4991 NICOTINE PATCH NONLEGEND: HCPCS | Performed by: OBSTETRICS & GYNECOLOGY

## 2021-01-23 PROCEDURE — 36415 COLL VENOUS BLD VENIPUNCTURE: CPT

## 2021-01-23 PROCEDURE — 99231 PR SUBSEQUENT HOSPITAL CARE,LEVL I: ICD-10-PCS | Mod: ,,, | Performed by: OBSTETRICS & GYNECOLOGY

## 2021-01-23 RX ORDER — IBUPROFEN 200 MG
1 TABLET ORAL DAILY
Status: DISCONTINUED | OUTPATIENT
Start: 2021-01-23 | End: 2021-01-25 | Stop reason: HOSPADM

## 2021-01-23 RX ORDER — OXYCODONE AND ACETAMINOPHEN 5; 325 MG/1; MG/1
1 TABLET ORAL EVERY 4 HOURS PRN
Qty: 5 TABLET | Refills: 0 | Status: SHIPPED | OUTPATIENT
Start: 2021-01-23 | End: 2021-09-28

## 2021-01-23 RX ORDER — NAPROXEN 500 MG/1
500 TABLET ORAL EVERY 8 HOURS PRN
Qty: 60 TABLET | Refills: 0 | Status: SHIPPED | OUTPATIENT
Start: 2021-01-23 | End: 2021-09-28

## 2021-01-23 RX ADMIN — OXYCODONE HYDROCHLORIDE AND ACETAMINOPHEN 1 TABLET: 5; 325 TABLET ORAL at 11:01

## 2021-01-23 RX ADMIN — NAPROXEN 500 MG: 500 TABLET ORAL at 05:01

## 2021-01-23 RX ADMIN — OXYCODONE HYDROCHLORIDE AND ACETAMINOPHEN 1 TABLET: 5; 325 TABLET ORAL at 03:01

## 2021-01-23 RX ADMIN — NICOTINE 1 PATCH: 14 PATCH, EXTENDED RELEASE TRANSDERMAL at 06:01

## 2021-01-23 RX ADMIN — NAPROXEN 500 MG: 500 TABLET ORAL at 07:01

## 2021-01-23 RX ADMIN — OXYCODONE HYDROCHLORIDE AND ACETAMINOPHEN 1 TABLET: 5; 325 TABLET ORAL at 05:01

## 2021-01-23 RX ADMIN — OXYCODONE HYDROCHLORIDE AND ACETAMINOPHEN 1 TABLET: 5; 325 TABLET ORAL at 10:01

## 2021-01-23 RX ADMIN — OXYCODONE HYDROCHLORIDE AND ACETAMINOPHEN 1 TABLET: 5; 325 TABLET ORAL at 07:01

## 2021-01-23 RX ADMIN — PRENATAL VIT W/ FE FUMARATE-FA TAB 27-0.8 MG 1 TABLET: 27-0.8 TAB at 10:01

## 2021-01-24 VITALS
HEART RATE: 76 BPM | SYSTOLIC BLOOD PRESSURE: 117 MMHG | RESPIRATION RATE: 18 BRPM | TEMPERATURE: 98 F | DIASTOLIC BLOOD PRESSURE: 76 MMHG | OXYGEN SATURATION: 96 %

## 2021-01-24 PROBLEM — Z34.90 PREGNANCY: Status: ACTIVE | Noted: 2021-01-24

## 2021-01-24 PROCEDURE — 25000003 PHARM REV CODE 250: Performed by: OBSTETRICS & GYNECOLOGY

## 2021-01-24 PROCEDURE — 99238 HOSP IP/OBS DSCHRG MGMT 30/<: CPT | Mod: ,,, | Performed by: OBSTETRICS & GYNECOLOGY

## 2021-01-24 PROCEDURE — 11000001 HC ACUTE MED/SURG PRIVATE ROOM

## 2021-01-24 PROCEDURE — 99238 PR HOSPITAL DISCHARGE DAY,<30 MIN: ICD-10-PCS | Mod: ,,, | Performed by: OBSTETRICS & GYNECOLOGY

## 2021-01-24 RX ADMIN — PRENATAL VIT W/ FE FUMARATE-FA TAB 27-0.8 MG 1 TABLET: 27-0.8 TAB at 08:01

## 2021-01-24 RX ADMIN — OXYCODONE HYDROCHLORIDE AND ACETAMINOPHEN 1 TABLET: 5; 325 TABLET ORAL at 03:01

## 2021-01-24 RX ADMIN — NAPROXEN 500 MG: 500 TABLET ORAL at 03:01

## 2021-01-24 RX ADMIN — OXYCODONE HYDROCHLORIDE AND ACETAMINOPHEN 1 TABLET: 5; 325 TABLET ORAL at 08:01

## 2021-01-26 ENCOUNTER — PATIENT MESSAGE (OUTPATIENT)
Dept: OBSTETRICS AND GYNECOLOGY | Facility: CLINIC | Age: 36
End: 2021-01-26

## 2021-01-26 DIAGNOSIS — R52 POSTPARTUM PAIN: Primary | ICD-10-CM

## 2021-01-26 RX ORDER — OXYCODONE AND ACETAMINOPHEN 5; 325 MG/1; MG/1
1 TABLET ORAL EVERY 6 HOURS PRN
Qty: 20 TABLET | Refills: 0 | Status: SHIPPED | OUTPATIENT
Start: 2021-01-26 | End: 2021-09-28

## 2021-01-27 DIAGNOSIS — R52 POSTPARTUM PAIN: ICD-10-CM

## 2021-01-29 RX ORDER — OXYCODONE AND ACETAMINOPHEN 5; 325 MG/1; MG/1
1 TABLET ORAL EVERY 6 HOURS PRN
Qty: 20 TABLET | Refills: 0 | OUTPATIENT
Start: 2021-01-29

## 2021-02-05 DIAGNOSIS — R52 POSTPARTUM PAIN: ICD-10-CM

## 2021-02-05 RX ORDER — OXYCODONE AND ACETAMINOPHEN 5; 325 MG/1; MG/1
1 TABLET ORAL EVERY 6 HOURS PRN
Qty: 20 TABLET | Refills: 0 | Status: CANCELLED | OUTPATIENT
Start: 2021-02-05

## 2021-02-06 ENCOUNTER — PATIENT MESSAGE (OUTPATIENT)
Dept: OBSTETRICS AND GYNECOLOGY | Facility: CLINIC | Age: 36
End: 2021-02-06

## 2021-02-08 ENCOUNTER — TELEPHONE (OUTPATIENT)
Dept: OBSTETRICS AND GYNECOLOGY | Facility: CLINIC | Age: 36
End: 2021-02-08

## 2021-02-08 DIAGNOSIS — R52 POSTPARTUM PAIN: Primary | ICD-10-CM

## 2021-02-08 RX ORDER — BUTALBITAL, ACETAMINOPHEN AND CAFFEINE 50; 325; 40 MG/1; MG/1; MG/1
1 TABLET ORAL EVERY 4 HOURS PRN
Qty: 30 TABLET | Refills: 1 | Status: SHIPPED | OUTPATIENT
Start: 2021-02-08 | End: 2021-02-24 | Stop reason: SDUPTHER

## 2021-02-12 ENCOUNTER — PATIENT MESSAGE (OUTPATIENT)
Dept: ADMINISTRATIVE | Facility: OTHER | Age: 36
End: 2021-02-12

## 2021-02-17 ENCOUNTER — TELEPHONE (OUTPATIENT)
Dept: OBSTETRICS AND GYNECOLOGY | Facility: CLINIC | Age: 36
End: 2021-02-17

## 2021-02-23 ENCOUNTER — TELEPHONE (OUTPATIENT)
Dept: OBSTETRICS AND GYNECOLOGY | Facility: CLINIC | Age: 36
End: 2021-02-23

## 2021-02-24 DIAGNOSIS — R52 POSTPARTUM PAIN: ICD-10-CM

## 2021-03-08 RX ORDER — BUTALBITAL, ACETAMINOPHEN AND CAFFEINE 50; 325; 40 MG/1; MG/1; MG/1
1 TABLET ORAL EVERY 4 HOURS PRN
Qty: 30 TABLET | Refills: 1 | Status: SHIPPED | OUTPATIENT
Start: 2021-03-08 | End: 2021-03-28 | Stop reason: SDUPTHER

## 2021-03-08 RX ORDER — OXYCODONE AND ACETAMINOPHEN 5; 325 MG/1; MG/1
1 TABLET ORAL EVERY 6 HOURS PRN
Qty: 20 TABLET | Refills: 0 | OUTPATIENT
Start: 2021-03-08

## 2021-05-06 ENCOUNTER — PATIENT MESSAGE (OUTPATIENT)
Dept: RESEARCH | Facility: HOSPITAL | Age: 36
End: 2021-05-06

## 2021-05-10 ENCOUNTER — PATIENT MESSAGE (OUTPATIENT)
Dept: RESEARCH | Facility: HOSPITAL | Age: 36
End: 2021-05-10

## 2021-08-05 ENCOUNTER — TELEPHONE (OUTPATIENT)
Dept: OBSTETRICS AND GYNECOLOGY | Facility: CLINIC | Age: 36
End: 2021-08-05

## 2021-08-05 ENCOUNTER — PATIENT MESSAGE (OUTPATIENT)
Dept: OBSTETRICS AND GYNECOLOGY | Facility: CLINIC | Age: 36
End: 2021-08-05

## 2021-08-05 DIAGNOSIS — N91.2 AMENORRHEA: Primary | ICD-10-CM

## 2021-08-05 DIAGNOSIS — R52 POSTPARTUM PAIN: ICD-10-CM

## 2021-08-06 ENCOUNTER — PATIENT MESSAGE (OUTPATIENT)
Dept: OBSTETRICS AND GYNECOLOGY | Facility: CLINIC | Age: 36
End: 2021-08-06

## 2021-08-16 RX ORDER — OXYCODONE AND ACETAMINOPHEN 5; 325 MG/1; MG/1
1 TABLET ORAL EVERY 6 HOURS PRN
Qty: 20 TABLET | Refills: 0 | OUTPATIENT
Start: 2021-08-16

## 2021-08-18 ENCOUNTER — PATIENT MESSAGE (OUTPATIENT)
Dept: OBSTETRICS AND GYNECOLOGY | Facility: CLINIC | Age: 36
End: 2021-08-18

## 2021-09-28 ENCOUNTER — OFFICE VISIT (OUTPATIENT)
Dept: OBSTETRICS AND GYNECOLOGY | Facility: CLINIC | Age: 36
End: 2021-09-28
Payer: MEDICAID

## 2021-09-28 ENCOUNTER — LAB VISIT (OUTPATIENT)
Dept: LAB | Facility: HOSPITAL | Age: 36
End: 2021-09-28
Attending: OBSTETRICS & GYNECOLOGY
Payer: MEDICAID

## 2021-09-28 VITALS
WEIGHT: 165.13 LBS | BODY MASS INDEX: 22.39 KG/M2 | SYSTOLIC BLOOD PRESSURE: 120 MMHG | DIASTOLIC BLOOD PRESSURE: 80 MMHG

## 2021-09-28 DIAGNOSIS — O10.919 CHRONIC HYPERTENSION AFFECTING PREGNANCY: ICD-10-CM

## 2021-09-28 DIAGNOSIS — Z32.01 POSITIVE URINE PREGNANCY TEST: ICD-10-CM

## 2021-09-28 DIAGNOSIS — Z32.01 POSITIVE URINE PREGNANCY TEST: Primary | ICD-10-CM

## 2021-09-28 DIAGNOSIS — Z01.419 WELL WOMAN EXAM WITH ROUTINE GYNECOLOGICAL EXAM: ICD-10-CM

## 2021-09-28 LAB
ABO + RH BLD: NORMAL
BASOPHILS # BLD AUTO: 0.03 K/UL (ref 0–0.2)
BASOPHILS NFR BLD: 0.4 % (ref 0–1.9)
BLD GP AB SCN CELLS X3 SERPL QL: NORMAL
DIFFERENTIAL METHOD: ABNORMAL
EOSINOPHIL # BLD AUTO: 0.1 K/UL (ref 0–0.5)
EOSINOPHIL NFR BLD: 1 % (ref 0–8)
ERYTHROCYTE [DISTWIDTH] IN BLOOD BY AUTOMATED COUNT: 13.4 % (ref 11.5–14.5)
HCT VFR BLD AUTO: 32.4 % (ref 37–48.5)
HGB BLD-MCNC: 11 G/DL (ref 12–16)
IMM GRANULOCYTES # BLD AUTO: 0.03 K/UL (ref 0–0.04)
IMM GRANULOCYTES NFR BLD AUTO: 0.4 % (ref 0–0.5)
LYMPHOCYTES # BLD AUTO: 1.4 K/UL (ref 1–4.8)
LYMPHOCYTES NFR BLD: 20.6 % (ref 18–48)
MCH RBC QN AUTO: 29.8 PG (ref 27–31)
MCHC RBC AUTO-ENTMCNC: 34 G/DL (ref 32–36)
MCV RBC AUTO: 88 FL (ref 82–98)
MONOCYTES # BLD AUTO: 0.3 K/UL (ref 0.3–1)
MONOCYTES NFR BLD: 5 % (ref 4–15)
NEUTROPHILS # BLD AUTO: 4.9 K/UL (ref 1.8–7.7)
NEUTROPHILS NFR BLD: 72.6 % (ref 38–73)
NRBC BLD-RTO: 0 /100 WBC
PLATELET # BLD AUTO: 292 K/UL (ref 150–450)
PMV BLD AUTO: 9.8 FL (ref 9.2–12.9)
RBC # BLD AUTO: 3.69 M/UL (ref 4–5.4)
WBC # BLD AUTO: 6.78 K/UL (ref 3.9–12.7)

## 2021-09-28 PROCEDURE — 86592 SYPHILIS TEST NON-TREP QUAL: CPT | Performed by: OBSTETRICS & GYNECOLOGY

## 2021-09-28 PROCEDURE — 86762 RUBELLA ANTIBODY: CPT | Performed by: OBSTETRICS & GYNECOLOGY

## 2021-09-28 PROCEDURE — 87481 CANDIDA DNA AMP PROBE: CPT | Mod: 59 | Performed by: OBSTETRICS & GYNECOLOGY

## 2021-09-28 PROCEDURE — 99999 PR PBB SHADOW E&M-EST. PATIENT-LVL II: CPT | Mod: PBBFAC,,, | Performed by: OBSTETRICS & GYNECOLOGY

## 2021-09-28 PROCEDURE — 85025 COMPLETE CBC W/AUTO DIFF WBC: CPT | Performed by: OBSTETRICS & GYNECOLOGY

## 2021-09-28 PROCEDURE — 99395 PREV VISIT EST AGE 18-39: CPT | Mod: S$PBB,,, | Performed by: OBSTETRICS & GYNECOLOGY

## 2021-09-28 PROCEDURE — 99999 PR PBB SHADOW E&M-EST. PATIENT-LVL II: ICD-10-PCS | Mod: PBBFAC,,, | Performed by: OBSTETRICS & GYNECOLOGY

## 2021-09-28 PROCEDURE — 88175 CYTOPATH C/V AUTO FLUID REDO: CPT | Performed by: OBSTETRICS & GYNECOLOGY

## 2021-09-28 PROCEDURE — 87086 URINE CULTURE/COLONY COUNT: CPT | Performed by: OBSTETRICS & GYNECOLOGY

## 2021-09-28 PROCEDURE — 99212 OFFICE O/P EST SF 10 MIN: CPT | Mod: PBBFAC,TH,PO | Performed by: OBSTETRICS & GYNECOLOGY

## 2021-09-28 PROCEDURE — 87340 HEPATITIS B SURFACE AG IA: CPT | Performed by: OBSTETRICS & GYNECOLOGY

## 2021-09-28 PROCEDURE — 99395 PR PREVENTIVE VISIT,EST,18-39: ICD-10-PCS | Mod: S$PBB,,, | Performed by: OBSTETRICS & GYNECOLOGY

## 2021-09-28 PROCEDURE — 87591 N.GONORRHOEAE DNA AMP PROB: CPT | Mod: 59 | Performed by: OBSTETRICS & GYNECOLOGY

## 2021-09-28 PROCEDURE — 87389 HIV-1 AG W/HIV-1&-2 AB AG IA: CPT | Performed by: OBSTETRICS & GYNECOLOGY

## 2021-09-28 PROCEDURE — 86900 BLOOD TYPING SEROLOGIC ABO: CPT | Performed by: OBSTETRICS & GYNECOLOGY

## 2021-09-28 PROCEDURE — 87491 CHLMYD TRACH DNA AMP PROBE: CPT | Performed by: OBSTETRICS & GYNECOLOGY

## 2021-09-29 LAB
HBV SURFACE AG SERPL QL IA: NEGATIVE
HIV 1+2 AB+HIV1 P24 AG SERPL QL IA: NEGATIVE
RPR SER QL: NORMAL
RUBV IGG SER-ACNC: 7 IU/ML
RUBV IGG SER-IMP: ABNORMAL

## 2021-09-30 ENCOUNTER — INITIAL PRENATAL (OUTPATIENT)
Dept: OBSTETRICS AND GYNECOLOGY | Facility: CLINIC | Age: 36
End: 2021-09-30
Payer: MEDICAID

## 2021-09-30 DIAGNOSIS — Z3A.26 26 WEEKS GESTATION OF PREGNANCY: Primary | ICD-10-CM

## 2021-09-30 LAB
C TRACH DNA SPEC QL NAA+PROBE: NOT DETECTED
N GONORRHOEA DNA SPEC QL NAA+PROBE: NOT DETECTED

## 2021-09-30 PROCEDURE — 99999 PR PBB SHADOW E&M-EST. PATIENT-LVL I: ICD-10-PCS | Mod: PBBFAC,,, | Performed by: OBSTETRICS & GYNECOLOGY

## 2021-09-30 PROCEDURE — 99211 OFF/OP EST MAY X REQ PHY/QHP: CPT | Mod: PBBFAC,PO | Performed by: OBSTETRICS & GYNECOLOGY

## 2021-09-30 PROCEDURE — 99213 PR OFFICE/OUTPT VISIT, EST, LEVL III, 20-29 MIN: ICD-10-PCS | Mod: TH,S$PBB,, | Performed by: OBSTETRICS & GYNECOLOGY

## 2021-09-30 PROCEDURE — 99213 OFFICE O/P EST LOW 20 MIN: CPT | Mod: TH,S$PBB,, | Performed by: OBSTETRICS & GYNECOLOGY

## 2021-09-30 PROCEDURE — 99999 PR PBB SHADOW E&M-EST. PATIENT-LVL I: CPT | Mod: PBBFAC,,, | Performed by: OBSTETRICS & GYNECOLOGY

## 2021-09-30 RX ORDER — BUTALBITAL, ACETAMINOPHEN AND CAFFEINE 50; 325; 40 MG/1; MG/1; MG/1
1 TABLET ORAL EVERY 4 HOURS PRN
Qty: 30 TABLET | Refills: 2 | Status: SHIPPED | OUTPATIENT
Start: 2021-09-30 | End: 2021-10-30

## 2021-10-01 LAB
BACTERIA UR CULT: NO GROWTH
BACTERIAL VAGINOSIS DNA: NEGATIVE
CANDIDA GLABRATA DNA: NEGATIVE
CANDIDA KRUSEI DNA: NEGATIVE
CANDIDA RRNA VAG QL PROBE: NEGATIVE
T VAGINALIS RRNA GENITAL QL PROBE: NEGATIVE

## 2021-10-05 ENCOUNTER — PATIENT MESSAGE (OUTPATIENT)
Dept: OBSTETRICS AND GYNECOLOGY | Facility: CLINIC | Age: 36
End: 2021-10-05

## 2021-10-07 ENCOUNTER — PATIENT MESSAGE (OUTPATIENT)
Dept: OBSTETRICS AND GYNECOLOGY | Facility: CLINIC | Age: 36
End: 2021-10-07

## 2021-10-14 ENCOUNTER — PATIENT MESSAGE (OUTPATIENT)
Dept: OBSTETRICS AND GYNECOLOGY | Facility: CLINIC | Age: 36
End: 2021-10-14
Payer: MEDICAID

## 2021-10-19 LAB
CYTOLOGIST CVX/VAG CYTO: NORMAL
CYTOLOGY CVX/VAG DOC CYTO: NORMAL
CYTOLOGY CVX/VAG DOC THIN PREP: NORMAL
CYTOLOGY THINPREP PAP COMMENT: NORMAL
CYTOLOGY THINPREP PAP COMMENT: NORMAL
PAP NOTE: NORMAL
PATHOLOGIST CVX/VAG CYTO: NORMAL
STAT OF ADQ CVX/VAG CYTO-IMP: NORMAL

## 2021-10-28 ENCOUNTER — ROUTINE PRENATAL (OUTPATIENT)
Dept: OBSTETRICS AND GYNECOLOGY | Facility: CLINIC | Age: 36
End: 2021-10-28
Payer: MEDICAID

## 2021-10-28 VITALS — BODY MASS INDEX: 23.05 KG/M2 | DIASTOLIC BLOOD PRESSURE: 72 MMHG | WEIGHT: 170 LBS | SYSTOLIC BLOOD PRESSURE: 115 MMHG

## 2021-10-28 DIAGNOSIS — Z3A.30 30 WEEKS GESTATION OF PREGNANCY: Primary | ICD-10-CM

## 2021-10-28 PROCEDURE — 99999 PR PBB SHADOW E&M-EST. PATIENT-LVL II: CPT | Mod: PBBFAC,,, | Performed by: OBSTETRICS & GYNECOLOGY

## 2021-10-28 PROCEDURE — 99213 OFFICE O/P EST LOW 20 MIN: CPT | Mod: TH,S$PBB,, | Performed by: OBSTETRICS & GYNECOLOGY

## 2021-10-28 PROCEDURE — 99212 OFFICE O/P EST SF 10 MIN: CPT | Mod: PBBFAC,PO | Performed by: OBSTETRICS & GYNECOLOGY

## 2021-10-28 PROCEDURE — 99213 PR OFFICE/OUTPT VISIT, EST, LEVL III, 20-29 MIN: ICD-10-PCS | Mod: TH,S$PBB,, | Performed by: OBSTETRICS & GYNECOLOGY

## 2021-10-28 PROCEDURE — 99999 PR PBB SHADOW E&M-EST. PATIENT-LVL II: ICD-10-PCS | Mod: PBBFAC,,, | Performed by: OBSTETRICS & GYNECOLOGY

## 2021-11-05 ENCOUNTER — PATIENT MESSAGE (OUTPATIENT)
Dept: ADMINISTRATIVE | Facility: OTHER | Age: 36
End: 2021-11-05
Payer: MEDICAID

## 2021-11-05 ENCOUNTER — PATIENT MESSAGE (OUTPATIENT)
Dept: OBSTETRICS AND GYNECOLOGY | Facility: CLINIC | Age: 36
End: 2021-11-05
Payer: MEDICAID

## 2021-11-09 ENCOUNTER — TELEPHONE (OUTPATIENT)
Dept: OBSTETRICS AND GYNECOLOGY | Facility: CLINIC | Age: 36
End: 2021-11-09
Payer: MEDICAID

## 2021-11-09 DIAGNOSIS — Z34.90 PREGNANCY, UNSPECIFIED GESTATIONAL AGE: Primary | ICD-10-CM

## 2021-11-18 ENCOUNTER — ROUTINE PRENATAL (OUTPATIENT)
Dept: OBSTETRICS AND GYNECOLOGY | Facility: CLINIC | Age: 36
End: 2021-11-18
Payer: MEDICAID

## 2021-11-18 VITALS
DIASTOLIC BLOOD PRESSURE: 70 MMHG | BODY MASS INDEX: 22.99 KG/M2 | WEIGHT: 169.56 LBS | SYSTOLIC BLOOD PRESSURE: 100 MMHG

## 2021-11-18 DIAGNOSIS — Z3A.33 33 WEEKS GESTATION OF PREGNANCY: Primary | ICD-10-CM

## 2021-11-18 PROCEDURE — 99999 PR PBB SHADOW E&M-EST. PATIENT-LVL II: ICD-10-PCS | Mod: PBBFAC,,, | Performed by: OBSTETRICS & GYNECOLOGY

## 2021-11-18 PROCEDURE — 99212 OFFICE O/P EST SF 10 MIN: CPT | Mod: PBBFAC,PO | Performed by: OBSTETRICS & GYNECOLOGY

## 2021-11-18 PROCEDURE — 99213 OFFICE O/P EST LOW 20 MIN: CPT | Mod: TH,S$PBB,, | Performed by: OBSTETRICS & GYNECOLOGY

## 2021-11-18 PROCEDURE — 99999 PR PBB SHADOW E&M-EST. PATIENT-LVL II: CPT | Mod: PBBFAC,,, | Performed by: OBSTETRICS & GYNECOLOGY

## 2021-11-18 PROCEDURE — 99213 PR OFFICE/OUTPT VISIT, EST, LEVL III, 20-29 MIN: ICD-10-PCS | Mod: TH,S$PBB,, | Performed by: OBSTETRICS & GYNECOLOGY

## 2021-11-18 RX ORDER — BUTALBITAL, ACETAMINOPHEN AND CAFFEINE 50; 325; 40 MG/1; MG/1; MG/1
TABLET ORAL
COMMUNITY
Start: 2021-11-15 | End: 2022-11-29

## 2021-11-30 ENCOUNTER — PATIENT MESSAGE (OUTPATIENT)
Dept: OBSTETRICS AND GYNECOLOGY | Facility: CLINIC | Age: 36
End: 2021-11-30
Payer: MEDICAID

## 2021-12-09 ENCOUNTER — ROUTINE PRENATAL (OUTPATIENT)
Dept: OBSTETRICS AND GYNECOLOGY | Facility: CLINIC | Age: 36
End: 2021-12-09
Payer: MEDICAID

## 2021-12-09 VITALS
SYSTOLIC BLOOD PRESSURE: 120 MMHG | BODY MASS INDEX: 24.17 KG/M2 | WEIGHT: 178.19 LBS | DIASTOLIC BLOOD PRESSURE: 80 MMHG

## 2021-12-09 DIAGNOSIS — Z3A.35 35 WEEKS GESTATION OF PREGNANCY: Primary | ICD-10-CM

## 2021-12-09 PROCEDURE — 99999 PR PBB SHADOW E&M-EST. PATIENT-LVL II: CPT | Mod: PBBFAC,,, | Performed by: OBSTETRICS & GYNECOLOGY

## 2021-12-09 PROCEDURE — 99212 OFFICE O/P EST SF 10 MIN: CPT | Mod: PBBFAC,PO | Performed by: OBSTETRICS & GYNECOLOGY

## 2021-12-09 PROCEDURE — 99213 OFFICE O/P EST LOW 20 MIN: CPT | Mod: TH,S$PBB,, | Performed by: OBSTETRICS & GYNECOLOGY

## 2021-12-09 PROCEDURE — 99213 PR OFFICE/OUTPT VISIT, EST, LEVL III, 20-29 MIN: ICD-10-PCS | Mod: TH,S$PBB,, | Performed by: OBSTETRICS & GYNECOLOGY

## 2021-12-09 PROCEDURE — 99999 PR PBB SHADOW E&M-EST. PATIENT-LVL II: ICD-10-PCS | Mod: PBBFAC,,, | Performed by: OBSTETRICS & GYNECOLOGY

## 2021-12-09 PROCEDURE — 87081 CULTURE SCREEN ONLY: CPT | Performed by: OBSTETRICS & GYNECOLOGY

## 2021-12-13 ENCOUNTER — ROUTINE PRENATAL (OUTPATIENT)
Dept: OBSTETRICS AND GYNECOLOGY | Facility: CLINIC | Age: 36
End: 2021-12-13
Payer: MEDICAID

## 2021-12-13 VITALS — DIASTOLIC BLOOD PRESSURE: 80 MMHG | WEIGHT: 178 LBS | SYSTOLIC BLOOD PRESSURE: 137 MMHG | BODY MASS INDEX: 24.14 KG/M2

## 2021-12-13 DIAGNOSIS — O09.33 LIMITED PRENATAL CARE IN THIRD TRIMESTER: Primary | ICD-10-CM

## 2021-12-13 DIAGNOSIS — Z28.39 RUBELLA NON-IMMUNE STATUS, ANTEPARTUM: ICD-10-CM

## 2021-12-13 DIAGNOSIS — Z3A.37 37 WEEKS GESTATION OF PREGNANCY: ICD-10-CM

## 2021-12-13 DIAGNOSIS — O09.899 RUBELLA NON-IMMUNE STATUS, ANTEPARTUM: ICD-10-CM

## 2021-12-13 LAB — BACTERIA SPEC AEROBE CULT: NORMAL

## 2021-12-13 PROCEDURE — 99999 PR PBB SHADOW E&M-EST. PATIENT-LVL II: ICD-10-PCS | Mod: PBBFAC,,, | Performed by: OBSTETRICS & GYNECOLOGY

## 2021-12-13 PROCEDURE — 99999 PR PBB SHADOW E&M-EST. PATIENT-LVL II: CPT | Mod: PBBFAC,,, | Performed by: OBSTETRICS & GYNECOLOGY

## 2021-12-13 PROCEDURE — 99213 OFFICE O/P EST LOW 20 MIN: CPT | Mod: TH,S$PBB,, | Performed by: OBSTETRICS & GYNECOLOGY

## 2021-12-13 PROCEDURE — 99213 PR OFFICE/OUTPT VISIT, EST, LEVL III, 20-29 MIN: ICD-10-PCS | Mod: TH,S$PBB,, | Performed by: OBSTETRICS & GYNECOLOGY

## 2021-12-13 PROCEDURE — 99212 OFFICE O/P EST SF 10 MIN: CPT | Mod: PBBFAC,TH,PN | Performed by: OBSTETRICS & GYNECOLOGY

## 2021-12-16 ENCOUNTER — PATIENT MESSAGE (OUTPATIENT)
Dept: OBSTETRICS AND GYNECOLOGY | Facility: CLINIC | Age: 36
End: 2021-12-16
Payer: MEDICAID

## 2021-12-18 ENCOUNTER — PATIENT MESSAGE (OUTPATIENT)
Dept: OBSTETRICS AND GYNECOLOGY | Facility: CLINIC | Age: 36
End: 2021-12-18
Payer: MEDICAID

## 2021-12-20 ENCOUNTER — HOSPITAL ENCOUNTER (INPATIENT)
Facility: HOSPITAL | Age: 36
LOS: 2 days | Discharge: HOME | End: 2021-12-22
Attending: OBSTETRICS & GYNECOLOGY | Admitting: OBSTETRICS & GYNECOLOGY
Payer: MEDICAID

## 2021-12-20 ENCOUNTER — ROUTINE PRENATAL (OUTPATIENT)
Dept: OBSTETRICS AND GYNECOLOGY | Facility: CLINIC | Age: 36
End: 2021-12-20
Payer: MEDICAID

## 2021-12-20 DIAGNOSIS — Z3A.38 38 WEEKS GESTATION OF PREGNANCY: Primary | ICD-10-CM

## 2021-12-20 DIAGNOSIS — Z34.90 PREGNANCY: ICD-10-CM

## 2021-12-20 LAB
BASOPHILS # BLD AUTO: 0.07 K/UL (ref 0–0.2)
BASOPHILS NFR BLD: 0.5 % (ref 0–1.9)
DIFFERENTIAL METHOD: ABNORMAL
EOSINOPHIL # BLD AUTO: 0.3 K/UL (ref 0–0.5)
EOSINOPHIL NFR BLD: 1.8 % (ref 0–8)
ERYTHROCYTE [DISTWIDTH] IN BLOOD BY AUTOMATED COUNT: 14.8 % (ref 11.5–14.5)
HCT VFR BLD AUTO: 32.3 % (ref 37–48.5)
HGB BLD-MCNC: 10.3 G/DL (ref 12–16)
IMM GRANULOCYTES # BLD AUTO: 0.26 K/UL (ref 0–0.04)
IMM GRANULOCYTES NFR BLD AUTO: 1.7 % (ref 0–0.5)
LYMPHOCYTES # BLD AUTO: 3.2 K/UL (ref 1–4.8)
LYMPHOCYTES NFR BLD: 20.4 % (ref 18–48)
MCH RBC QN AUTO: 28.1 PG (ref 27–31)
MCHC RBC AUTO-ENTMCNC: 31.9 G/DL (ref 32–36)
MCV RBC AUTO: 88 FL (ref 82–98)
MONOCYTES # BLD AUTO: 0.8 K/UL (ref 0.3–1)
MONOCYTES NFR BLD: 5.1 % (ref 4–15)
NEUTROPHILS # BLD AUTO: 10.9 K/UL (ref 1.8–7.7)
NEUTROPHILS NFR BLD: 70.5 % (ref 38–73)
NRBC BLD-RTO: 0 /100 WBC
PLATELET # BLD AUTO: 414 K/UL (ref 150–450)
PMV BLD AUTO: 10.8 FL (ref 9.2–12.9)
RBC # BLD AUTO: 3.66 M/UL (ref 4–5.4)
WBC # BLD AUTO: 15.47 K/UL (ref 3.9–12.7)

## 2021-12-20 PROCEDURE — 86901 BLOOD TYPING SEROLOGIC RH(D): CPT | Performed by: OBSTETRICS & GYNECOLOGY

## 2021-12-20 PROCEDURE — 87340 HEPATITIS B SURFACE AG IA: CPT | Performed by: OBSTETRICS & GYNECOLOGY

## 2021-12-20 PROCEDURE — 87389 HIV-1 AG W/HIV-1&-2 AB AG IA: CPT | Performed by: OBSTETRICS & GYNECOLOGY

## 2021-12-20 PROCEDURE — 36415 COLL VENOUS BLD VENIPUNCTURE: CPT | Performed by: OBSTETRICS & GYNECOLOGY

## 2021-12-20 PROCEDURE — 85025 COMPLETE CBC W/AUTO DIFF WBC: CPT | Performed by: OBSTETRICS & GYNECOLOGY

## 2021-12-20 PROCEDURE — U0002 COVID-19 LAB TEST NON-CDC: HCPCS | Performed by: OBSTETRICS & GYNECOLOGY

## 2021-12-20 PROCEDURE — 11000001 HC ACUTE MED/SURG PRIVATE ROOM

## 2021-12-20 PROCEDURE — 99213 OFFICE O/P EST LOW 20 MIN: CPT | Mod: TH,S$PBB,, | Performed by: OBSTETRICS & GYNECOLOGY

## 2021-12-20 PROCEDURE — 80053 COMPREHEN METABOLIC PANEL: CPT | Performed by: OBSTETRICS & GYNECOLOGY

## 2021-12-20 PROCEDURE — 86592 SYPHILIS TEST NON-TREP QUAL: CPT | Performed by: OBSTETRICS & GYNECOLOGY

## 2021-12-20 PROCEDURE — 99213 PR OFFICE/OUTPT VISIT, EST, LEVL III, 20-29 MIN: ICD-10-PCS | Mod: TH,S$PBB,, | Performed by: OBSTETRICS & GYNECOLOGY

## 2021-12-20 RX ORDER — CALCIUM CARBONATE 200(500)MG
500 TABLET,CHEWABLE ORAL 3 TIMES DAILY PRN
Status: DISCONTINUED | OUTPATIENT
Start: 2021-12-21 | End: 2021-12-21

## 2021-12-20 RX ORDER — SODIUM CHLORIDE 9 MG/ML
INJECTION, SOLUTION INTRAVENOUS
Status: DISCONTINUED | OUTPATIENT
Start: 2021-12-21 | End: 2021-12-21

## 2021-12-20 RX ORDER — SODIUM CHLORIDE, SODIUM LACTATE, POTASSIUM CHLORIDE, CALCIUM CHLORIDE 600; 310; 30; 20 MG/100ML; MG/100ML; MG/100ML; MG/100ML
INJECTION, SOLUTION INTRAVENOUS CONTINUOUS
Status: DISCONTINUED | OUTPATIENT
Start: 2021-12-21 | End: 2021-12-21

## 2021-12-20 RX ORDER — OXYTOCIN/RINGER'S LACTATE 30/500 ML
95 PLASTIC BAG, INJECTION (ML) INTRAVENOUS ONCE
Status: DISCONTINUED | OUTPATIENT
Start: 2021-12-20 | End: 2021-12-21

## 2021-12-20 RX ORDER — OXYTOCIN/RINGER'S LACTATE 30/500 ML
334 PLASTIC BAG, INJECTION (ML) INTRAVENOUS ONCE
Status: COMPLETED | OUTPATIENT
Start: 2021-12-20 | End: 2021-12-21

## 2021-12-20 RX ORDER — PROCHLORPERAZINE EDISYLATE 5 MG/ML
5 INJECTION INTRAMUSCULAR; INTRAVENOUS EVERY 6 HOURS PRN
Status: DISCONTINUED | OUTPATIENT
Start: 2021-12-21 | End: 2021-12-21

## 2021-12-20 RX ORDER — ONDANSETRON 8 MG/1
8 TABLET, ORALLY DISINTEGRATING ORAL EVERY 8 HOURS PRN
Status: DISCONTINUED | OUTPATIENT
Start: 2021-12-21 | End: 2021-12-21

## 2021-12-20 RX ORDER — TRANEXAMIC ACID 100 MG/ML
1000 INJECTION, SOLUTION INTRAVENOUS ONCE AS NEEDED
Status: DISCONTINUED | OUTPATIENT
Start: 2021-12-20 | End: 2021-12-21

## 2021-12-20 RX ORDER — OXYTOCIN/RINGER'S LACTATE 30/500 ML
0-30 PLASTIC BAG, INJECTION (ML) INTRAVENOUS CONTINUOUS
Status: DISCONTINUED | OUTPATIENT
Start: 2021-12-21 | End: 2021-12-21

## 2021-12-20 RX ORDER — SIMETHICONE 80 MG
1 TABLET,CHEWABLE ORAL 4 TIMES DAILY PRN
Status: DISCONTINUED | OUTPATIENT
Start: 2021-12-21 | End: 2021-12-21

## 2021-12-21 ENCOUNTER — CLINICAL SUPPORT (OUTPATIENT)
Dept: SMOKING CESSATION | Facility: CLINIC | Age: 36
End: 2021-12-21

## 2021-12-21 ENCOUNTER — ANESTHESIA (OUTPATIENT)
Dept: OBSTETRICS AND GYNECOLOGY | Facility: HOSPITAL | Age: 36
End: 2021-12-21
Payer: MEDICAID

## 2021-12-21 ENCOUNTER — ANESTHESIA EVENT (OUTPATIENT)
Dept: OBSTETRICS AND GYNECOLOGY | Facility: HOSPITAL | Age: 36
End: 2021-12-21
Payer: MEDICAID

## 2021-12-21 DIAGNOSIS — F17.210 CIGARETTE SMOKER: Primary | ICD-10-CM

## 2021-12-21 LAB
ABO + RH BLD: NORMAL
ALBUMIN SERPL BCP-MCNC: 2.4 G/DL (ref 3.5–5.2)
ALLENS TEST: ABNORMAL
ALP SERPL-CCNC: 162 U/L (ref 55–135)
ALT SERPL W/O P-5'-P-CCNC: 7 U/L (ref 10–44)
AMPHET+METHAMPHET UR QL: ABNORMAL
ANION GAP SERPL CALC-SCNC: 11 MMOL/L (ref 8–16)
AST SERPL-CCNC: 11 U/L (ref 10–40)
BARBITURATES UR QL SCN>200 NG/ML: NEGATIVE
BENZODIAZ UR QL SCN>200 NG/ML: NEGATIVE
BILIRUB SERPL-MCNC: 0.2 MG/DL (ref 0.1–1)
BLD GP AB SCN CELLS X3 SERPL QL: NORMAL
BUN SERPL-MCNC: 5 MG/DL (ref 6–20)
BZE UR QL SCN: NEGATIVE
CALCIUM SERPL-MCNC: 9.1 MG/DL (ref 8.7–10.5)
CANNABINOIDS UR QL SCN: ABNORMAL
CHLORIDE SERPL-SCNC: 103 MMOL/L (ref 95–110)
CO2 SERPL-SCNC: 24 MMOL/L (ref 23–29)
CREAT SERPL-MCNC: 0.7 MG/DL (ref 0.5–1.4)
CREAT UR-MCNC: 30 MG/DL (ref 15–325)
EST. GFR  (AFRICAN AMERICAN): >60 ML/MIN/1.73 M^2
EST. GFR  (NON AFRICAN AMERICAN): >60 ML/MIN/1.73 M^2
ETHANOL UR-MCNC: <10 MG/DL
GLUCOSE SERPL-MCNC: 103 MG/DL (ref 70–110)
HBV SURFACE AG SERPL QL IA: NEGATIVE
HCO3 UR-SCNC: 24.5 MMOL/L (ref 24–28)
HCT VFR BLD CALC: 36 %PCV (ref 36–54)
HGB BLD-MCNC: 12 G/DL
HIV 1+2 AB+HIV1 P24 AG SERPL QL IA: NEGATIVE
METHADONE UR QL SCN>300 NG/ML: NEGATIVE
OPIATES UR QL SCN: NEGATIVE
PCO2 BLDA: 40.3 MMHG (ref 35–45)
PCP UR QL SCN>25 NG/ML: NEGATIVE
PH SMN: 7.39 [PH] (ref 7.35–7.45)
PO2 BLDA: 47 MMHG (ref 50–70)
POC BE: 0 MMOL/L
POC SATURATED O2: 82 % (ref 95–100)
POC TCO2: 26 MMOL/L (ref 23–27)
POTASSIUM BLD-SCNC: 3.9 MMOL/L (ref 3.5–5.1)
POTASSIUM SERPL-SCNC: 4.6 MMOL/L (ref 3.5–5.1)
PROT SERPL-MCNC: 6.1 G/DL (ref 6–8.4)
SAMPLE: ABNORMAL
SARS-COV-2 RDRP RESP QL NAA+PROBE: NEGATIVE
SITE: ABNORMAL
SODIUM BLD-SCNC: 136 MMOL/L (ref 136–145)
SODIUM SERPL-SCNC: 138 MMOL/L (ref 136–145)
TOXICOLOGY INFORMATION: ABNORMAL

## 2021-12-21 PROCEDURE — 99406 PT REFUSED TOBACCO CESSATION: ICD-10-PCS | Mod: S$GLB,,,

## 2021-12-21 PROCEDURE — 72200005 HC VAGINAL DELIVERY LEVEL II

## 2021-12-21 PROCEDURE — 72100002 HC LABOR CARE, 1ST 8 HOURS

## 2021-12-21 PROCEDURE — 63600175 PHARM REV CODE 636 W HCPCS: Performed by: OBSTETRICS & GYNECOLOGY

## 2021-12-21 PROCEDURE — 80307 DRUG TEST PRSMV CHEM ANLYZR: CPT | Performed by: OBSTETRICS & GYNECOLOGY

## 2021-12-21 PROCEDURE — 62326 NJX INTERLAMINAR LMBR/SAC: CPT | Performed by: STUDENT IN AN ORGANIZED HEALTH CARE EDUCATION/TRAINING PROGRAM

## 2021-12-21 PROCEDURE — 51702 INSERT TEMP BLADDER CATH: CPT

## 2021-12-21 PROCEDURE — 25000003 PHARM REV CODE 250: Performed by: OBSTETRICS & GYNECOLOGY

## 2021-12-21 PROCEDURE — 99406 BEHAV CHNG SMOKING 3-10 MIN: CPT | Mod: S$GLB,,,

## 2021-12-21 PROCEDURE — 27800516 HC TRAY, EPIDURAL COMBO: Performed by: STUDENT IN AN ORGANIZED HEALTH CARE EDUCATION/TRAINING PROGRAM

## 2021-12-21 PROCEDURE — 27200710 HC EPIDURAL INFUSION PUMP SET: Performed by: STUDENT IN AN ORGANIZED HEALTH CARE EDUCATION/TRAINING PROGRAM

## 2021-12-21 PROCEDURE — 59409 PR OBSTETRICAL CARE,VAG DELIV ONLY: ICD-10-PCS | Mod: AT,,, | Performed by: OBSTETRICS & GYNECOLOGY

## 2021-12-21 PROCEDURE — 25000003 PHARM REV CODE 250: Performed by: STUDENT IN AN ORGANIZED HEALTH CARE EDUCATION/TRAINING PROGRAM

## 2021-12-21 PROCEDURE — 72100003 HC LABOR CARE, EA. ADDL. 8 HRS

## 2021-12-21 PROCEDURE — 11000001 HC ACUTE MED/SURG PRIVATE ROOM

## 2021-12-21 PROCEDURE — 59409 OBSTETRICAL CARE: CPT | Mod: AT,,, | Performed by: OBSTETRICS & GYNECOLOGY

## 2021-12-21 RX ORDER — FENTANYL/ROPIVACAINE/NS/PF 2MCG/ML-.2
PLASTIC BAG, INJECTION (ML) INJECTION CONTINUOUS PRN
Status: DISCONTINUED | OUTPATIENT
Start: 2021-12-21 | End: 2021-12-21

## 2021-12-21 RX ORDER — FENTANYL/ROPIVACAINE/NS/PF 2MCG/ML-.2
PLASTIC BAG, INJECTION (ML) INJECTION CONTINUOUS
Status: DISCONTINUED | OUTPATIENT
Start: 2021-12-21 | End: 2021-12-21

## 2021-12-21 RX ORDER — LIDOCAINE HYDROCHLORIDE AND EPINEPHRINE 15; 5 MG/ML; UG/ML
INJECTION, SOLUTION EPIDURAL
Status: DISCONTINUED | OUTPATIENT
Start: 2021-12-21 | End: 2021-12-21

## 2021-12-21 RX ORDER — IBUPROFEN 600 MG/1
600 TABLET ORAL EVERY 6 HOURS PRN
Status: DISCONTINUED | OUTPATIENT
Start: 2021-12-21 | End: 2021-12-22 | Stop reason: HOSPADM

## 2021-12-21 RX ORDER — ONDANSETRON 8 MG/1
8 TABLET, ORALLY DISINTEGRATING ORAL EVERY 8 HOURS PRN
Status: DISCONTINUED | OUTPATIENT
Start: 2021-12-21 | End: 2021-12-22 | Stop reason: HOSPADM

## 2021-12-21 RX ORDER — SODIUM CITRATE AND CITRIC ACID MONOHYDRATE 334; 500 MG/5ML; MG/5ML
30 SOLUTION ORAL ONCE
Status: DISCONTINUED | OUTPATIENT
Start: 2021-12-21 | End: 2021-12-21

## 2021-12-21 RX ORDER — SIMETHICONE 80 MG
1 TABLET,CHEWABLE ORAL EVERY 6 HOURS PRN
Status: DISCONTINUED | OUTPATIENT
Start: 2021-12-21 | End: 2021-12-22 | Stop reason: HOSPADM

## 2021-12-21 RX ORDER — MISOPROSTOL 200 UG/1
800 TABLET ORAL
Status: DISCONTINUED | OUTPATIENT
Start: 2021-12-21 | End: 2021-12-22 | Stop reason: HOSPADM

## 2021-12-21 RX ORDER — OXYTOCIN/RINGER'S LACTATE 30/500 ML
95 PLASTIC BAG, INJECTION (ML) INTRAVENOUS ONCE
Status: DISCONTINUED | OUTPATIENT
Start: 2021-12-21 | End: 2021-12-22 | Stop reason: HOSPADM

## 2021-12-21 RX ORDER — HYDROCODONE BITARTRATE AND ACETAMINOPHEN 10; 325 MG/1; MG/1
1 TABLET ORAL EVERY 4 HOURS PRN
Status: DISCONTINUED | OUTPATIENT
Start: 2021-12-21 | End: 2021-12-22 | Stop reason: HOSPADM

## 2021-12-21 RX ORDER — HYDROCORTISONE 25 MG/G
CREAM TOPICAL 3 TIMES DAILY PRN
Status: DISCONTINUED | OUTPATIENT
Start: 2021-12-21 | End: 2021-12-22 | Stop reason: HOSPADM

## 2021-12-21 RX ORDER — CARBOPROST TROMETHAMINE 250 UG/ML
250 INJECTION, SOLUTION INTRAMUSCULAR
Status: DISCONTINUED | OUTPATIENT
Start: 2021-12-21 | End: 2021-12-22 | Stop reason: HOSPADM

## 2021-12-21 RX ORDER — MUPIROCIN 20 MG/G
OINTMENT TOPICAL
Status: DISCONTINUED | OUTPATIENT
Start: 2021-12-21 | End: 2021-12-21

## 2021-12-21 RX ORDER — METHYLERGONOVINE MALEATE 0.2 MG/ML
200 INJECTION INTRAVENOUS
Status: DISCONTINUED | OUTPATIENT
Start: 2021-12-21 | End: 2021-12-22 | Stop reason: HOSPADM

## 2021-12-21 RX ORDER — FENTANYL/ROPIVACAINE/NS/PF 2MCG/ML-.2
PLASTIC BAG, INJECTION (ML) INJECTION
Status: COMPLETED
Start: 2021-12-21 | End: 2021-12-21

## 2021-12-21 RX ORDER — HYDROCODONE BITARTRATE AND ACETAMINOPHEN 5; 325 MG/1; MG/1
1 TABLET ORAL EVERY 4 HOURS PRN
Status: DISCONTINUED | OUTPATIENT
Start: 2021-12-21 | End: 2021-12-22 | Stop reason: HOSPADM

## 2021-12-21 RX ORDER — SODIUM CHLORIDE 0.9 % (FLUSH) 0.9 %
10 SYRINGE (ML) INJECTION
Status: DISCONTINUED | OUTPATIENT
Start: 2021-12-21 | End: 2021-12-22 | Stop reason: HOSPADM

## 2021-12-21 RX ORDER — DIPHENHYDRAMINE HYDROCHLORIDE 50 MG/ML
25 INJECTION INTRAMUSCULAR; INTRAVENOUS EVERY 4 HOURS PRN
Status: DISCONTINUED | OUTPATIENT
Start: 2021-12-21 | End: 2021-12-22 | Stop reason: HOSPADM

## 2021-12-21 RX ORDER — FAMOTIDINE 10 MG/ML
20 INJECTION INTRAVENOUS ONCE
Status: DISCONTINUED | OUTPATIENT
Start: 2021-12-21 | End: 2021-12-21

## 2021-12-21 RX ORDER — DOCUSATE SODIUM 100 MG/1
200 CAPSULE, LIQUID FILLED ORAL 2 TIMES DAILY PRN
Status: DISCONTINUED | OUTPATIENT
Start: 2021-12-21 | End: 2021-12-22 | Stop reason: HOSPADM

## 2021-12-21 RX ORDER — PROCHLORPERAZINE EDISYLATE 5 MG/ML
5 INJECTION INTRAMUSCULAR; INTRAVENOUS EVERY 6 HOURS PRN
Status: DISCONTINUED | OUTPATIENT
Start: 2021-12-21 | End: 2021-12-22 | Stop reason: HOSPADM

## 2021-12-21 RX ORDER — ACETAMINOPHEN 325 MG/1
650 TABLET ORAL EVERY 6 HOURS PRN
Status: DISCONTINUED | OUTPATIENT
Start: 2021-12-21 | End: 2021-12-22 | Stop reason: HOSPADM

## 2021-12-21 RX ORDER — DIPHENOXYLATE HYDROCHLORIDE AND ATROPINE SULFATE 2.5; .025 MG/1; MG/1
1 TABLET ORAL 4 TIMES DAILY PRN
Status: DISCONTINUED | OUTPATIENT
Start: 2021-12-21 | End: 2021-12-22 | Stop reason: HOSPADM

## 2021-12-21 RX ADMIN — HYDROCODONE BITARTRATE AND ACETAMINOPHEN 1 TABLET: 10; 325 TABLET ORAL at 12:12

## 2021-12-21 RX ADMIN — HYDROCODONE BITARTRATE AND ACETAMINOPHEN 1 TABLET: 10; 325 TABLET ORAL at 08:12

## 2021-12-21 RX ADMIN — Medication 2 MILLI-UNITS/MIN: at 04:12

## 2021-12-21 RX ADMIN — IBUPROFEN 600 MG: 600 TABLET ORAL at 12:12

## 2021-12-21 RX ADMIN — BENZOCAINE: 0.1 GEL TOPICAL at 01:12

## 2021-12-21 RX ADMIN — FENTANYL CIT 0.2 MG/100ML-ROPIV 0.2%-NACL 0.9% EPIDURAL INJ 10 ML/HR: 2/0.2 SOLUTION at 06:12

## 2021-12-21 RX ADMIN — LIDOCAINE HYDROCHLORIDE,EPINEPHRINE BITARTRATE 3 ML: 15; .005 INJECTION, SOLUTION EPIDURAL; INFILTRATION; INTRACAUDAL; PERINEURAL at 06:12

## 2021-12-21 RX ADMIN — SODIUM CHLORIDE, SODIUM LACTATE, POTASSIUM CHLORIDE, AND CALCIUM CHLORIDE: .6; .31; .03; .02 INJECTION, SOLUTION INTRAVENOUS at 12:12

## 2021-12-21 RX ADMIN — Medication 334 MILLI-UNITS/MIN: at 08:12

## 2021-12-21 RX ADMIN — HYDROCODONE BITARTRATE AND ACETAMINOPHEN 1 TABLET: 10; 325 TABLET ORAL at 04:12

## 2021-12-21 RX ADMIN — IBUPROFEN 600 MG: 600 TABLET ORAL at 08:12

## 2021-12-21 RX ADMIN — SODIUM CHLORIDE, SODIUM LACTATE, POTASSIUM CHLORIDE, AND CALCIUM CHLORIDE: .6; .31; .03; .02 INJECTION, SOLUTION INTRAVENOUS at 02:12

## 2021-12-21 NOTE — PLAN OF CARE
SW was contacted by patient's  Shyla Carl [phone] advising pt was preparing to report to hospital for labor. Pt has pending adoption for  with [email or web address]. Pt's  sent via email - pt's HIPAA release form, hospital reference sheet and  Shyla Carl's bar card and identification card.     4:18pm - Pt had not arrived to hospital. SW left adoption documentation with Labor and Delivery nursing staff for placement in patient's chart.       SW will follow up with patient

## 2021-12-21 NOTE — NURSING
"Patient arrived to unit sent over from the clinic for "labor"  Patient placed on EFM. Irregular contractions present. . Patient denies LOF or vaginal bleeding. Reports good fetal movement. SVE same as in office this AM.   Dr. Nice notified of patient arrival. Orders placed.       "

## 2021-12-21 NOTE — L&D DELIVERY NOTE
[facility] - Labor & Delivery  Vaginal Delivery   Operative Note    SUMMARY     Normal spontaneous vaginal delivery of live infant, skin to skin was unable to be performed due to baby placed for adoption.  Infant delivered position OA over intact perineum.  Nuchal cord: No.    Spontaneous delivery of placenta and IV pitocin given noting good uterine tone.  No lacerations noted.  Patient tolerated delivery well. Sponge needle and lap counted correctly x2.    Indications: <principal problem not specified>  Pregnancy complicated by:   Patient Active Problem List   Diagnosis    Limited prenatal care in third trimester    ADD (attention deficit disorder)- taking adderol occasionally    Chronic hypertension affecting pregnancy    Anemia    Rubella non-immune status, antepartum    Pregnancy     Admitting GA: 38w4d    Delivery Information for Danyel Landry    Birth information:  YOB: 2019   Time of birth: 8:08 AM   Sex: male   Head Delivery Date/Time:     Delivery type:    Gestational Age: 38w4d    Delivery Providers    Delivering clinician:            Measurements    Weight:   Length:          Apgars    Living status:   Apgars:  1 min.:  5 min.:  10 min.:  15 min.:  20 min.:    Skin color:         Heart rate:         Reflex irritability:         Muscle tone:         Respiratory effort:         Total:                                Interventions/Resuscitation         Cord    No data filed       Placenta    Placenta delivery date/time: 2021 0810  Placenta removal: Spontaneous  Placenta appearance: Intact  Placenta disposition: discarded           Labor Events:       labor:       Labor Onset Date/Time:         Dilation Complete Date/Time:         Start Pushing Date/Time:         Start Pushing Date/Time:       Rupture Date/Time:            Rupture type:          Fluid Amount:       Fluid Color:       Fluid Odor:       Membrane Status:                steroids:       Antibiotics  given for GBS:       Induction:       Indications for induction:        Augmentation:       Indications for augmentation:       Labor complications:       Additional complications:          Cervical ripening:                     Delivery:      Episiotomy:       Indication for Episiotomy:       Perineal Lacerations:   Repaired:      Periurethral Laceration:   Repaired:     Labial Laceration:   Repaired:     Sulcus Laceration:   Repaired:     Vaginal Laceration:   Repaired:     Cervical Laceration:   Repaired:     Repair suture:       Repair # of packets:       Last Value - EBL - Nursing (mL):       Sum - EBL - Nursing (mL): 0     Last Value - EBL - Anesthesia (mL):      Calculated QBL (mL):       Vaginal Sweep Performed:       Surgicount Correct:         Other providers:            Details (if applicable):  Trial of Labor      Categorization:      Priority:     Indications for :     Incision Type:       Additional  information:  Forceps:    Vacuum:    Breech:    Observed anomalies    Other (Comments):

## 2021-12-21 NOTE — PLAN OF CARE
SW visited patient's room to assess needs and discuss pending adoption. Pt was sleeping.       SW will return to visit with patient.

## 2021-12-21 NOTE — NURSING
SVE performed 5/80/0  Pitocin started at 2mU - educated on med administration.   Instructed to call out when ready for epidural. No pain at this time. Will continue to monitor

## 2021-12-21 NOTE — H&P
History and Physical  Obstetrics      SUBJECTIVE:     Greer Landry is a 36 y.o.  female with an Estimated Date of Delivery: 21 admitted for labor management.  Her current obstetrical history is significant for rubella non immune, undesired fertility, baby to be placed for adoption, Migraines.  She reports contractions since yesterday. Fetal Movement: normal.    PTA Medications   Medication Sig    butalbital-acetaminophen-caffeine -40 mg (FIORICET, ESGIC) -40 mg per tablet Take by mouth.    PNV no.106-iron-folate #6-dha (OB COMPLETE GOLD) 27.5 mg iron- 1 mg Cap Take 27.5 mg by mouth once daily.       Review of patient's allergies indicates:  No Known Allergies     Past Medical History:   Diagnosis Date    ADD (attention deficit disorder)     Chronic hypertension     no meds- states anxiety induced     No past surgical history on file.  Family History   Problem Relation Age of Onset    Cancer Paternal Grandfather     No Known Problems Paternal Grandmother     No Known Problems Maternal Grandmother     No Known Problems Maternal Grandfather     No Known Problems Father     No Known Problems Mother     No Known Problems Sister      Social History     Tobacco Use    Smoking status: Current Every Day Smoker     Packs/day: 0.50     Types: Cigarettes    Smokeless tobacco: Current User    Tobacco comment: Pt states quit when she learned that she was pregnant.    Substance Use Topics    Alcohol use: No    Drug use: Yes     Types: Marijuana     ROS:  GENERAL: Feeling well overall. Denies fever or chills.   SKIN: Denies rash or lesions.   HEAD: Denies head injury or headache.   NODES: Denies enlarged lymph nodes.   CHEST: Denies chest pain or shortness of breath.   CARDIOVASCULAR: Denies palpitations or left sided chest pain.    ABDOMEN: Denies diarrhea, nausea, vomiting or rectal bleeding.   URINARY: No dysuria, hematuria, or burning on urination.  REPRODUCTIVE: See HPI.    BREASTS: Denies pain, lumps, or nipple discharge.   HEMATOLOGIC: No easy bruisability or excessive bleeding.   MUSCULOSKELETAL: Denies joint pain or swelling.   NEUROLOGIC: Denies syncope or weakness.   PSYCHIATRIC: Denies depression, anxiety or mood swings.         Vital Signs (Most Recent)       Physical Exam:  General:  alert and normal appearing gravid female   Skin:  Skin color, texture, turgor normal. No rashes or lesions   HEENT:  conjunctivae/corneas clear. PERRL.   Lungs:  clear to auscultation bilaterally   Heart:  regular rate and rhythm       Abdomen:  soft, non-tender; bowel sounds normal   Uterine Size:  size equals dates   Presentations:  cephalic   FHT:  130-40's BPM   Pelvis: Exam deferred.   Cervix:     Dilation: 5cm    Effacement: 75%    Station:  -1    Consistency: soft    Position: anterior     Laboratory:  No results for input(s): ABORH, HEPBSAG, RUBELLAIGGSC, GBS, AFP, WDHPFCS5QQ, CBC in the last 168 hours.   ASSESSMENT/PLAN:     38w4d gestation.  Active phase labor.     Conditions: Undesired Fertility and LPNC, baby to be placed for adoption    Patient Active Problem List   Diagnosis    Limited prenatal care in third trimester    ADD (attention deficit disorder)- taking adderol occasionally    Chronic hypertension affecting pregnancy    Anemia    Rubella non-immune status, antepartum    Pregnancy        Risks, benefits, alternatives and possible complications have been discussed in detail with the patient.  Pre-admission, admission, and post admission procedures and expectations were discussed in detail.  All questions answered, all appropriate consents will be signed at the Hospital. Admission is planned for today.   epidural

## 2021-12-21 NOTE — PLAN OF CARE
Note copied from Infant's chart ( MRN: [id number])     SOCIAL WORK DISCHARGE PLANNING ASSESSMENT     Sw completed discharge planning assessment with pt's mother in mother's room K348 . During time of assessment patient's mother's  Ana Sargent was present and at bedside. Pt's mother was easily engaged and education on the role of  was provided. Emotional support provided throughout assessment. Pt's mother has planned adoption with [employer] ([phone])  / [facility]. Pt's mother advised her discharge plan is to leave as soon as she is medically clear and would like for pt to discharge to the custody of adoptive parents Terese Masters and Elie Masters.Pt's mother completed [facility] Consent of Transfer of Physical Custody of . SW and pt's bedside nurse Aaron MERCADO witnessed and signed document confirming completion by pt's mother. Identification of pt's potential adoptive parent Terese Masters was copied and placed in pt's chart. SW will obtain potential adoptive father Elie Masters's identification upon arrival back to Hospital.  Pt's mother will have family provide her transportation at  discharge.         Legal Name: Ahsan Landry      :  2019  Address: Mother's address ( [address] [place] [address])   Parent's Phone Numbers: [phone] ( Mother- Greer Landry)                   Patient Active Problem List   Diagnosis    Single liveborn infant delivered vaginally            Birth Hospital:[facility]           ERA: 2021      Birth Weight:   2.985 kg (6 lb 9.3 oz)                                      Gestational Age: 38w4d           Apgars    Living status: Living  Apgars:  1 min.:  5 min.:  10 min.:  15 min.:  20 min.:    Skin color:  1  1          Heart rate:  2  2          Reflex irritability:  2  2          Muscle tone:  2  2          Respiratory effort:  2  2          Total:  9  9          Apgars assigned by:  JOSÉ LUIS PANIAGUA,RN                 12/21/21 2167   OB Discharge Planning Assessment   Assessment Type Discharge Planning Assessment   Source of Information family  (Greer Landry [phone])   Verified Demographic and Insurance Information Yes   Insurance Medicaid   Medicaid [facility]   Medicaid Insurance Primary   Spiritual Affiliation Hindu   Primary Contact Name and Number Greer Landry [phone]   Received Prenatal Care Yes   Transportation Anticipated other (see comments)  (Pt will discharge with adoptive family Terese Masters & Elie Masters)   Receive [facility] Benefits N/A   Adoption Planned yes   Mother Plans for Involvement in Infant Care Following Delivery? no   Adoptive Parents Names (if known) Terese& Elie Masters   Adoption Type open    Involved yes  (Ana Sargent LCSW)   Infant Feeding Plan formula feeding   Does baby have crib or safe sleep space? Yes   Do you have a car seat? Yes   Has other essential care items? Clothing;Bottles;Diapers   DCFS No indications (Indicators for Report)   Discharge Plan A Home with family   Prenatal/Birth Information   Adoption Agency [facility]/ [facility]

## 2021-12-21 NOTE — NURSING
Patient turned to R side.   Pitocin lowered to 2mU per MD request.   KIRIT Arellano updated on plan of care.

## 2021-12-22 VITALS
HEIGHT: 72 IN | RESPIRATION RATE: 18 BRPM | WEIGHT: 177.94 LBS | TEMPERATURE: 98 F | OXYGEN SATURATION: 98 % | SYSTOLIC BLOOD PRESSURE: 109 MMHG | BODY MASS INDEX: 24.1 KG/M2 | DIASTOLIC BLOOD PRESSURE: 74 MMHG | HEART RATE: 63 BPM

## 2021-12-22 LAB
BASOPHILS # BLD AUTO: 0.09 K/UL (ref 0–0.2)
BASOPHILS NFR BLD: 0.6 % (ref 0–1.9)
DIFFERENTIAL METHOD: ABNORMAL
EOSINOPHIL # BLD AUTO: 0.3 K/UL (ref 0–0.5)
EOSINOPHIL NFR BLD: 1.8 % (ref 0–8)
ERYTHROCYTE [DISTWIDTH] IN BLOOD BY AUTOMATED COUNT: 14.6 % (ref 11.5–14.5)
HCT VFR BLD AUTO: 30.9 % (ref 37–48.5)
HGB BLD-MCNC: 10.1 G/DL (ref 12–16)
IMM GRANULOCYTES # BLD AUTO: 0.15 K/UL (ref 0–0.04)
IMM GRANULOCYTES NFR BLD AUTO: 1 % (ref 0–0.5)
LYMPHOCYTES # BLD AUTO: 3.5 K/UL (ref 1–4.8)
LYMPHOCYTES NFR BLD: 23 % (ref 18–48)
MCH RBC QN AUTO: 28.8 PG (ref 27–31)
MCHC RBC AUTO-ENTMCNC: 32.7 G/DL (ref 32–36)
MCV RBC AUTO: 88 FL (ref 82–98)
MONOCYTES # BLD AUTO: 0.7 K/UL (ref 0.3–1)
MONOCYTES NFR BLD: 4.8 % (ref 4–15)
NEUTROPHILS # BLD AUTO: 10.4 K/UL (ref 1.8–7.7)
NEUTROPHILS NFR BLD: 68.8 % (ref 38–73)
NRBC BLD-RTO: 0 /100 WBC
PLATELET # BLD AUTO: 361 K/UL (ref 150–450)
PMV BLD AUTO: 10.5 FL (ref 9.2–12.9)
RBC # BLD AUTO: 3.51 M/UL (ref 4–5.4)
RPR SER QL: NORMAL
RPR SER QL: NORMAL
WBC # BLD AUTO: 15.1 K/UL (ref 3.9–12.7)

## 2021-12-22 PROCEDURE — 85025 COMPLETE CBC W/AUTO DIFF WBC: CPT | Performed by: OBSTETRICS & GYNECOLOGY

## 2021-12-22 PROCEDURE — 36415 COLL VENOUS BLD VENIPUNCTURE: CPT | Performed by: OBSTETRICS & GYNECOLOGY

## 2021-12-22 PROCEDURE — 25000003 PHARM REV CODE 250: Performed by: OBSTETRICS & GYNECOLOGY

## 2021-12-22 PROCEDURE — 99238 HOSP IP/OBS DSCHRG MGMT 30/<: CPT | Mod: ,,, | Performed by: OBSTETRICS & GYNECOLOGY

## 2021-12-22 PROCEDURE — 99238 PR HOSPITAL DISCHARGE DAY,<30 MIN: ICD-10-PCS | Mod: ,,, | Performed by: OBSTETRICS & GYNECOLOGY

## 2021-12-22 RX ADMIN — HYDROCODONE BITARTRATE AND ACETAMINOPHEN 1 TABLET: 10; 325 TABLET ORAL at 12:12

## 2021-12-22 RX ADMIN — HYDROCODONE BITARTRATE AND ACETAMINOPHEN 1 TABLET: 10; 325 TABLET ORAL at 05:12

## 2021-12-22 NOTE — DISCHARGE SUMMARY
Pt is a 36 y.o. yo  PPD 1 from vaginal delivery. She was admitted on 2021 for Labor. She was managed accordingly. She delivered a male infant weighing 6#9oz.  By PPD 1, she was voiding without difficulty, ambulating well, tolerating a diet, and passing flatus. Her pain is well controlled. H/H is   Lab Results   Component Value Date    WBC 15.10 (H) 2021    HGB 10.1 (L) 2021    HCT 30.9 (L) 2021    MCV 88 2021     2021     . She was desiring discharge home. She was given discharge teaching and instructions prior to leaving the hospital.  Pt voiced understanding of all instructions. Baby placed for adoption and will be d/c'd to adopting parents    D/C pt. To home in stable condition  Reg diet  Ad jordon activity with pelvic rest x 6 wks  Call for fever >101, heavy vag bleeding, pain uncontrolled w/ pain meds  F/u in office in 6wks to schedule BTL   Motrin OTC  Mynor Nice MD

## 2021-12-22 NOTE — DISCHARGE INSTRUCTIONS
"Patient Discharge Instructions for Postpartum Women    Resume Regular Diet  Increase activity gradually, no heavy lifting  Shower  No tampons, douching or sexual intercourse.  Discuss birth control options with your physician.  Wear a support bra  Return to work/school when you've been cleared by a physician    Call your physician if     *Fever of 100.4 or higher  *Persistent nausea/ vomiting  *Incisional drainage  *Heavy vaginal bleeding or large clots (Heavy bleeding is soaking 1 pad in an hour)  *Swelling and pain in arms or legs  *Severe headaches, blurred vision or fainting  *Shortness of breath  *Frequency and burning with urination  *Signs of postpartum depression, discuss these signs with your physician    Call lactation services for questions regarding feeding, nipple and breast care, and general questions about lactation.  They can be reached at [phone]         Understanding Postpartum Depression    You've just had a baby.  You know you should be excited and happy.  But instead you find yourself crying for no reason.  You may have trouble coping with your daily tasks.  You feel sad, tired, and hopeless most of the time.  You may even feel ashamed or guilty.  But what you're going through is not your fault and you can feel better.  Talk to your doctor.  He or she can help.    Depression After Childbirth    You may be weepy and tired right after giving birth.  These feelings are normal.  They're sometimes called the "baby blues."  These blues go away 2-3 weeks.  However, postpartum (meaning "after birth") depression lasts much longer and is more sever than the "baby blues."  It can make you feel sad and hopeless.  You may also fear that your baby will be harmed and worry about being a bad mother.      What is Depression?    Depression is a mood disorder that affects the way you think and feel.  The most common symptom is a feeling of deep sadness.  You may also feel as if you just can't cope with life.  "   Other symptoms include:      * Gaining or loosing weight  * Sleeping too much or too little  * Feeling tired all the time  * Feeling restless  * Fears of harming your baby   * Lack of interest in your baby  * Feeling worthless or guilty  * No longer finding pleasure in things you used to  * Having trouble thinking clearly or making decisions  * Thoughts of hurting yourself or your baby    What Causes Postpartum Depression    The exact causes of postpartum depression isn't known.  It may be due to changes in your hormones during and after childbirth.  You may also be tired from caring for your baby and adjusting to being a mother.  All these factors may make you feel depressed.  In some cases, your genes may also play a role.    Depression Can Be Treated    The good news is that there are many ways to treat postpartum depression.  Talking to your doctor is the first step toward feeling better.    Resources:    * National [place] of Mental Health  -- 151.965.4997    www.nimh.nih.gov    * National [place] on Mental Illness --881.706.3383    Www.lazaro.org    * Mental Health [place] -- 735.945.4250     Www.[facility].org    * National Suicide Hotline --606.601.9237 (800-SUICIDE)    0060-5138 [employer]  All rights reserved.  This information is not intended as a substitute for professional medical care.  Always follow up with your healthcare professional's instructions.         Oriented - self; Oriented - place; Oriented - time

## 2021-12-22 NOTE — PLAN OF CARE
Pt and Mother Greer Landry  has HIPAA release of information form in chart.     JEREMY spoke with Shyla Carl pt's adoption  ( [phone]) to advise of DCFS report and informed Ms. Carl that the  will have at a minimum 3-5  extended stay for observation. Ms. Carl requested that when a DCFS  is assigned to please provide her personal  contact  information to the  for communication with pt's case.      Ms. Carl advised the patient's mother will surrender rights to patient  on 2021. Ms. Carl informed SW as soon as documentation is completed she will forward to SW if needed and DCFS .      Ms. Carl informed SW  she will notify potential adoptive parents Terese and Elie Reynoldsy  of pt's extended stay in hospital for observation.         SW to follow up.

## 2021-12-22 NOTE — PLAN OF CARE
SW consulted to see patient for  Infant's positive utox report of Amphetamines. SW met with patient in her room, pt was easily engaged during visit. SW explained the purpose of visit and pt voiced understanding.       SW verified demographic information, support persons and again discussed discharge planning. Pt is currently living at home with her father Yogi Landry ([phone]) and three children ( son age 17 and daughters ages 10 & 11 months). Pt reported she has total of five children, one was placed for adoption and most recent birth has pending adoption. Pt does not work or attend school, and did not identify source of income. Pt has planned adoption with [facility] adoption ( Shyla Carl   [phone] adoption ) and plans for adoptive parents Terese and Elie Masters  to discharge with . Pt's hospital preference was to not have contact with  following birth.       SW discussed with patient that  has  positive utox for Amphetamines. Pt reported she used  Adderall prior to pregnancy and tried to discontinue usage after finding out she was pregnant. Pt informed SW she would only use half a pill as needed for studying and reading. Pt does not have a current prescription for the medication and advised the RX was first prescribed in .Pt stated instead of getting the medication off the street she obtained the medication from a friend.     SW informed pt that a report will be made to [facility] because the infant tested positive for  Amphetamines. Pt verbalized understanding.     SW made telephonic contact with  [facility]  [phone] and oral report was made to Sharlene Mayorga. Pt's intake number  [phone]. [facility] agent Sharlene KOHLI was notified that pt's mother has a planned adoption in place.  Electronic report filed via Mandated  Portal.    SW to follow up.

## 2021-12-24 ENCOUNTER — PATIENT MESSAGE (OUTPATIENT)
Dept: OBSTETRICS AND GYNECOLOGY | Facility: CLINIC | Age: 36
End: 2021-12-24
Payer: MEDICAID

## 2021-12-27 LAB
HCO3 UR-SCNC: 26.1 MMOL/L (ref 24–28)
HCT VFR BLD CALC: 37 %PCV (ref 36–54)
HGB BLD-MCNC: 13 G/DL
PCO2 BLDA: 48.3 MMHG (ref 35–45)
PH SMN: 7.34 [PH] (ref 7.35–7.45)
PO2 BLDA: 23 MMHG (ref 50–70)
POC BE: 0 MMOL/L
POC SATURATED O2: 37 % (ref 95–100)
POC TCO2: 28 MMOL/L (ref 23–27)
POTASSIUM BLD-SCNC: 4.3 MMOL/L (ref 3.5–5.1)
SAMPLE: ABNORMAL
SITE: ABNORMAL
SODIUM BLD-SCNC: 136 MMOL/L (ref 136–145)

## 2021-12-27 RX ORDER — OXYCODONE AND ACETAMINOPHEN 5; 325 MG/1; MG/1
1 TABLET ORAL EVERY 8 HOURS PRN
Qty: 10 TABLET | Refills: 0 | Status: SHIPPED | OUTPATIENT
Start: 2021-12-27 | End: 2022-11-29

## 2021-12-28 ENCOUNTER — PATIENT MESSAGE (OUTPATIENT)
Dept: OBSTETRICS AND GYNECOLOGY | Facility: CLINIC | Age: 36
End: 2021-12-28
Payer: MEDICAID

## 2022-01-07 ENCOUNTER — PATIENT MESSAGE (OUTPATIENT)
Dept: OBSTETRICS AND GYNECOLOGY | Facility: CLINIC | Age: 37
End: 2022-01-07
Payer: MEDICAID

## 2022-01-11 ENCOUNTER — PATIENT MESSAGE (OUTPATIENT)
Dept: OBSTETRICS AND GYNECOLOGY | Facility: CLINIC | Age: 37
End: 2022-01-11
Payer: MEDICAID

## 2022-01-13 ENCOUNTER — PATIENT MESSAGE (OUTPATIENT)
Dept: OBSTETRICS AND GYNECOLOGY | Facility: CLINIC | Age: 37
End: 2022-01-13
Payer: MEDICAID

## 2022-03-28 PROBLEM — Z37.9 NORMAL LABOR: Status: RESOLVED | Noted: 2017-03-21 | Resolved: 2022-03-28

## 2022-06-23 ENCOUNTER — PATIENT MESSAGE (OUTPATIENT)
Dept: OBSTETRICS AND GYNECOLOGY | Facility: CLINIC | Age: 37
End: 2022-06-23
Payer: MEDICAID

## 2022-09-19 NOTE — PROGRESS NOTES
34 yo  female at 22.4 wks by 14.1 wk sono (EDC 2021) who presents for f/u OB visit.    Obhx:   x 4 (two babies were given up for adoption)  TAB x 2   delivery with Fetal demise at 19 weeks in 2019    1) SIUP (it's a girl)  -suboptimal view of spine   -Rh positive  -pap normal  -too late to discuss genetic testing  -consents for vag/blood/BTL signed on 10/5/2020    2) placenta previa    3) cHTN: baseline PIH wnl  No meds    4) PP  Wants BTL  RNI    F/u in 4 wks, anatomy, 1 hr gtt  stephane platt MD  
Attending with

## 2022-11-10 ENCOUNTER — TELEPHONE (OUTPATIENT)
Dept: OBSTETRICS AND GYNECOLOGY | Facility: CLINIC | Age: 37
End: 2022-11-10
Payer: MEDICAID

## 2022-11-10 NOTE — TELEPHONE ENCOUNTER
----- Message from Estevan Slater sent at 11/10/2022 12:37 PM CST -----  Contact: Pt  Reschedule New Pregnancy Appt    Who Called: Pt  Would the patient rather a call back or a response via [facility]? Call/Both  Best Call Back Number: [phone]  Additional Information:   Please call back for appt date/time.

## 2022-11-18 ENCOUNTER — PATIENT MESSAGE (OUTPATIENT)
Dept: OBSTETRICS AND GYNECOLOGY | Facility: CLINIC | Age: 37
End: 2022-11-18
Payer: MEDICAID

## 2022-11-29 ENCOUNTER — LAB VISIT (OUTPATIENT)
Dept: LAB | Facility: HOSPITAL | Age: 37
End: 2022-11-29
Attending: ADVANCED PRACTICE MIDWIFE
Payer: MEDICAID

## 2022-11-29 ENCOUNTER — OFFICE VISIT (OUTPATIENT)
Dept: OBSTETRICS AND GYNECOLOGY | Facility: CLINIC | Age: 37
End: 2022-11-29
Payer: MEDICAID

## 2022-11-29 VITALS
BODY MASS INDEX: 21.56 KG/M2 | WEIGHT: 159.19 LBS | HEIGHT: 72 IN | SYSTOLIC BLOOD PRESSURE: 120 MMHG | DIASTOLIC BLOOD PRESSURE: 70 MMHG

## 2022-11-29 DIAGNOSIS — Z32.01 POSITIVE PREGNANCY TEST: ICD-10-CM

## 2022-11-29 DIAGNOSIS — F17.200 SMOKER: ICD-10-CM

## 2022-11-29 DIAGNOSIS — N91.2 AMENORRHEA: Primary | ICD-10-CM

## 2022-11-29 PROBLEM — D64.9 ANEMIA: Status: RESOLVED | Noted: 2021-01-23 | Resolved: 2022-11-29

## 2022-11-29 PROBLEM — O09.32 INSUFFICIENT PRENATAL CARE IN SECOND TRIMESTER: Status: ACTIVE | Noted: 2017-01-24

## 2022-11-29 PROBLEM — O10.919 CHRONIC HYPERTENSION AFFECTING PREGNANCY: Status: RESOLVED | Noted: 2021-01-21 | Resolved: 2022-11-29

## 2022-11-29 PROBLEM — Z28.39 RUBELLA NON-IMMUNE STATUS, ANTEPARTUM: Status: RESOLVED | Noted: 2021-01-23 | Resolved: 2022-11-29

## 2022-11-29 PROBLEM — O09.33 LIMITED PRENATAL CARE IN THIRD TRIMESTER: Status: RESOLVED | Noted: 2017-01-24 | Resolved: 2022-11-29

## 2022-11-29 PROBLEM — O09.899 RUBELLA NON-IMMUNE STATUS, ANTEPARTUM: Status: RESOLVED | Noted: 2021-01-23 | Resolved: 2022-11-29

## 2022-11-29 LAB
B-HCG UR QL: POSITIVE
CTP QC/QA: YES

## 2022-11-29 PROCEDURE — 87086 URINE CULTURE/COLONY COUNT: CPT | Performed by: ADVANCED PRACTICE MIDWIFE

## 2022-11-29 PROCEDURE — 3078F DIAST BP <80 MM HG: CPT | Mod: CPTII,,, | Performed by: ADVANCED PRACTICE MIDWIFE

## 2022-11-29 PROCEDURE — 86803 HEPATITIS C AB TEST: CPT | Performed by: ADVANCED PRACTICE MIDWIFE

## 2022-11-29 PROCEDURE — 86762 RUBELLA ANTIBODY: CPT | Performed by: ADVANCED PRACTICE MIDWIFE

## 2022-11-29 PROCEDURE — 81025 URINE PREGNANCY TEST: CPT | Mod: PBBFAC,PO | Performed by: ADVANCED PRACTICE MIDWIFE

## 2022-11-29 PROCEDURE — 87389 HIV-1 AG W/HIV-1&-2 AB AG IA: CPT | Performed by: ADVANCED PRACTICE MIDWIFE

## 2022-11-29 PROCEDURE — 81514 NFCT DS BV&VAGINITIS DNA ALG: CPT | Performed by: ADVANCED PRACTICE MIDWIFE

## 2022-11-29 PROCEDURE — 85025 COMPLETE CBC W/AUTO DIFF WBC: CPT | Performed by: ADVANCED PRACTICE MIDWIFE

## 2022-11-29 PROCEDURE — 86850 RBC ANTIBODY SCREEN: CPT | Performed by: ADVANCED PRACTICE MIDWIFE

## 2022-11-29 PROCEDURE — 86592 SYPHILIS TEST NON-TREP QUAL: CPT | Performed by: ADVANCED PRACTICE MIDWIFE

## 2022-11-29 PROCEDURE — 99212 OFFICE O/P EST SF 10 MIN: CPT | Mod: PBBFAC,TH,PO,25 | Performed by: ADVANCED PRACTICE MIDWIFE

## 2022-11-29 PROCEDURE — 3074F SYST BP LT 130 MM HG: CPT | Mod: CPTII,,, | Performed by: ADVANCED PRACTICE MIDWIFE

## 2022-11-29 PROCEDURE — 87491 CHLMYD TRACH DNA AMP PROBE: CPT | Performed by: ADVANCED PRACTICE MIDWIFE

## 2022-11-29 PROCEDURE — 99999 PR PBB SHADOW E&M-EST. PATIENT-LVL II: ICD-10-PCS | Mod: PBBFAC,,, | Performed by: ADVANCED PRACTICE MIDWIFE

## 2022-11-29 PROCEDURE — 99213 PR OFFICE/OUTPT VISIT, EST, LEVL III, 20-29 MIN: ICD-10-PCS | Mod: S$PBB,TH,, | Performed by: ADVANCED PRACTICE MIDWIFE

## 2022-11-29 PROCEDURE — 3074F PR MOST RECENT SYSTOLIC BLOOD PRESSURE < 130 MM HG: ICD-10-PCS | Mod: CPTII,,, | Performed by: ADVANCED PRACTICE MIDWIFE

## 2022-11-29 PROCEDURE — 3008F PR BODY MASS INDEX (BMI) DOCUMENTED: ICD-10-PCS | Mod: CPTII,,, | Performed by: ADVANCED PRACTICE MIDWIFE

## 2022-11-29 PROCEDURE — 99213 OFFICE O/P EST LOW 20 MIN: CPT | Mod: S$PBB,TH,, | Performed by: ADVANCED PRACTICE MIDWIFE

## 2022-11-29 PROCEDURE — 3008F BODY MASS INDEX DOCD: CPT | Mod: CPTII,,, | Performed by: ADVANCED PRACTICE MIDWIFE

## 2022-11-29 PROCEDURE — 99999 PR PBB SHADOW E&M-EST. PATIENT-LVL II: CPT | Mod: PBBFAC,,, | Performed by: ADVANCED PRACTICE MIDWIFE

## 2022-11-29 PROCEDURE — 76805 OB US >/= 14 WKS SNGL FETUS: CPT | Mod: PBBFAC,PO | Performed by: OBSTETRICS & GYNECOLOGY

## 2022-11-29 PROCEDURE — 76805 US OB/GYN PROCEDURE (VIEWPOINT): ICD-10-PCS | Mod: 26,S$PBB,, | Performed by: OBSTETRICS & GYNECOLOGY

## 2022-11-29 PROCEDURE — 3078F PR MOST RECENT DIASTOLIC BLOOD PRESSURE < 80 MM HG: ICD-10-PCS | Mod: CPTII,,, | Performed by: ADVANCED PRACTICE MIDWIFE

## 2022-11-29 PROCEDURE — 87591 N.GONORRHOEAE DNA AMP PROB: CPT | Performed by: ADVANCED PRACTICE MIDWIFE

## 2022-11-29 PROCEDURE — 87340 HEPATITIS B SURFACE AG IA: CPT | Performed by: ADVANCED PRACTICE MIDWIFE

## 2022-11-29 PROCEDURE — 87088 URINE BACTERIA CULTURE: CPT | Performed by: ADVANCED PRACTICE MIDWIFE

## 2022-11-29 NOTE — PROGRESS NOTES
CHIEF COMPLAINT:   Patient presents with      Possible Pregnancy        HISTORY OF PRESENT ILLNESS  Greer Landry 37 y.o.  presents for pregnancy risk assessment.   The patient has no complaints today.  No nausea or vomiting. No bleeding or pain.  Pregnancy was not planned but is desired.  Partner is supportive of pregnancy.  Lives at home with partner.  No pets at home  Works at home  Denies domestic abuse.  Denies chemical/pesticide/radiation exposure.  OB history: x6 3 placed for adoption SAB x3 one at 17 weeks      LMP: May 2022  EDC: Estimated Date of Delivery:   EGA: 25w3d      Health Maintenance   Topic Date Due    Hepatitis C Screening  Never done    Lipid Panel  Never done    TETANUS VACCINE  2031       Past Medical History:   Diagnosis Date    ADD (attention deficit disorder)     Chronic hypertension     no meds- states anxiety induced       History reviewed. No pertinent surgical history.    Family History   Problem Relation Age of Onset    Cancer Paternal Grandfather     No Known Problems Paternal Grandmother     No Known Problems Maternal Grandmother     No Known Problems Maternal Grandfather     No Known Problems Father     No Known Problems Mother     No Known Problems Sister     Breast cancer Neg Hx     Colon cancer Neg Hx     Ovarian cancer Neg Hx        Social History     Socioeconomic History    Marital status: Single   Tobacco Use    Smoking status: Every Day     Packs/day: 0.50     Types: Cigarettes    Smokeless tobacco: Current    Tobacco comments:     Pt states quit when she learned that she was pregnant.    Substance and Sexual Activity    Alcohol use: No    Drug use: Not Currently     Types: Marijuana    Sexual activity: Yes     Partners: Male     Birth control/protection: None       Current Outpatient Medications   Medication Sig Dispense Refill    PNV no.106-iron-folate #6-dha (OB COMPLETE GOLD) 27.5 mg iron- 1 mg Cap Take 27.5 mg by mouth once  daily. 30 capsule 11     No current facility-administered medications for this visit.       Review of patient's allergies indicates:  No Known Allergies      PHYSICAL EXAM   Vitals:    11/29/22 1001   BP: 120/70        PAIN SCALE: 0/10 None    PHYSICAL EXAM    ROS:  GENERAL: No fever, chills, fatigability or weight loss.  CV: Denies chest pain  PULM: Denies shortness of breath or wheezing.  ABDOMEN: Appetite fine. No weight loss. Denies diarrhea, abdominal pain, hematemesis or blood in stool.  URINARY: No flank pain, dysuria or hematuria.  REPRODUCTIVE: No abnormal vaginal bleeding.       PE:   APPEARANCE: Well nourished, well developed, in no acute distress  CHEST: Clear to auscultation bilaterally  CV: Regular rate and rhythm  BREASTS: Symmetrical, no skin changes or visible lesions. No palpable masses, nipple discharge or adenopathy bilaterally.  ABDOMEN: Soft. No tenderness or masses. No hepatosplenomegaly. No hernias  PELVIC:   VULVA: No lesions. Normal female genitalia.  URETHRAL MEATUS: Normal size and location, no lesions, no prolapse.  URETHRA: No masses, tenderness, prolapse or scarring.  VAGINA: Moist and well rugated, no discharge, no significant cystocele or rectocele.  CERVIX: No lesions, normal diameter, no stenosis, no cervical motion tenderness.   UTERUS: 25 weeks size, regular shape, mobile, non-tender, normal position, good support.  ADNEXA: No masses, tenderness or CDS nodularity.  ANUS PERINEUM: No lesions, no relaxation, no external hemorrhoids.     UPT +    A/P:     -      Patient was counseled today on A.C.S. Pap guidelines and recommendations for yearly pelvic exams, mammograms and monthly self breast exams; to see her PCP for other health maintenance and pregnancy.   -      Patient's medications and medical history reviewed with patient and implications in pregnancy.   -      Pregnancy course discussed and 'AtoZ' book given. Patient was counseled on proper weight gain based on the [place]  of Medicine's recommendations based on her pre-pregnancy weight. Discussed foods to avoid in pregnancy (i.e. sushi, fish that are high in mercury, deli meat, and unpasteurized cheeses). Discussed prenatal vitamin options (i.e. stool softener, DHA). Discussed potential medical problems in pregnancy.  -     Discussed risk of Toxoplasmosis transmission from pets and reviewed risk reduction techniques.  -     Discused increased risks with AMA status.    -     Chromosomal abnormality risk discussed and available testing offered - too late    -     Pt was counseled on exercise in pregnancy and weight gain recommendations.  - ACOG and SMFM recommend that all eligible persons, including pregnant and lactating individuals, receive a COVID-19 vaccine or vaccine series. There is no evidence of adverse maternal or fetal effects from vaccinating pregnant individuals with COVID-19 vaccine, and a growing body of data demonstrate the safety of such use. Discussed that there is no indication to delay vaccination until after the first trimester or until the postpartum period.  Claims linking COVID-19 vaccines to infertility are unfounded and have no scientific evidence supporting them. ACOG recommends vaccination for all eligible people who may consider future pregnancy.  Expected side effects were discussed and explained that they are a normal part of the body's reaction to the vaccine and developing antibodies to protect against COVID-19 illness.  Women under age 50 including pregnant individuals can receive any FDA-authorized COVID-19 vaccine available to them. However, there is a rare risk (around 1 in 150,000) of thrombosis with thrombocytopenia syndrome (TTS) after receipt of the [place] COVID-19 vaccine which was discussed.  COVID-19 vaccines may be administered simultaneously with other vaccines, including within 14 days of receipt of another vaccine. This includes vaccines routinely administered during pregnancy, such as  influenza and Tdap.    -     Oriented to practice including CNM collaboration.   -     Follow-up initial OB, with labs and u/s.   Sono shows 25w2d, 1#13oz,48%, anterior placenta,normal anatomy,girl  Genprobe and affirm

## 2022-11-30 LAB
ABO + RH BLD: NORMAL
BASOPHILS # BLD AUTO: 0.07 K/UL (ref 0–0.2)
BASOPHILS NFR BLD: 0.6 % (ref 0–1.9)
BLD GP AB SCN CELLS X3 SERPL QL: NORMAL
DIFFERENTIAL METHOD: ABNORMAL
EOSINOPHIL # BLD AUTO: 0.2 K/UL (ref 0–0.5)
EOSINOPHIL NFR BLD: 1.4 % (ref 0–8)
ERYTHROCYTE [DISTWIDTH] IN BLOOD BY AUTOMATED COUNT: 13 % (ref 11.5–14.5)
HBV SURFACE AG SERPL QL IA: NORMAL
HCT VFR BLD AUTO: 37.4 % (ref 37–48.5)
HCV AB SERPL QL IA: NORMAL
HGB BLD-MCNC: 11.5 G/DL (ref 12–16)
HIV 1+2 AB+HIV1 P24 AG SERPL QL IA: NORMAL
IMM GRANULOCYTES # BLD AUTO: 0.06 K/UL (ref 0–0.04)
IMM GRANULOCYTES NFR BLD AUTO: 0.5 % (ref 0–0.5)
LYMPHOCYTES # BLD AUTO: 2.4 K/UL (ref 1–4.8)
LYMPHOCYTES NFR BLD: 19.3 % (ref 18–48)
MCH RBC QN AUTO: 29.2 PG (ref 27–31)
MCHC RBC AUTO-ENTMCNC: 30.7 G/DL (ref 32–36)
MCV RBC AUTO: 95 FL (ref 82–98)
MONOCYTES # BLD AUTO: 0.5 K/UL (ref 0.3–1)
MONOCYTES NFR BLD: 4.3 % (ref 4–15)
NEUTROPHILS # BLD AUTO: 9.3 K/UL (ref 1.8–7.7)
NEUTROPHILS NFR BLD: 73.9 % (ref 38–73)
NRBC BLD-RTO: 0 /100 WBC
PLATELET # BLD AUTO: 440 K/UL (ref 150–450)
PMV BLD AUTO: 9.4 FL (ref 9.2–12.9)
RBC # BLD AUTO: 3.94 M/UL (ref 4–5.4)
RPR SER QL: NORMAL
RUBV IGG SER-ACNC: 6.5 IU/ML
RUBV IGG SER-IMP: ABNORMAL
WBC # BLD AUTO: 12.56 K/UL (ref 3.9–12.7)

## 2022-12-01 LAB
BACTERIA UR CULT: ABNORMAL
BACTERIAL VAGINOSIS DNA: NEGATIVE
C TRACH DNA SPEC QL NAA+PROBE: NOT DETECTED
CANDIDA GLABRATA DNA: NEGATIVE
CANDIDA KRUSEI DNA: NEGATIVE
CANDIDA RRNA VAG QL PROBE: NEGATIVE
N GONORRHOEA DNA SPEC QL NAA+PROBE: NOT DETECTED
T VAGINALIS RRNA GENITAL QL PROBE: NEGATIVE

## 2022-12-05 ENCOUNTER — PATIENT MESSAGE (OUTPATIENT)
Dept: OBSTETRICS AND GYNECOLOGY | Facility: CLINIC | Age: 37
End: 2022-12-05
Payer: MEDICAID

## 2022-12-05 DIAGNOSIS — R51.9 NONINTRACTABLE HEADACHE, UNSPECIFIED CHRONICITY PATTERN, UNSPECIFIED HEADACHE TYPE: Primary | ICD-10-CM

## 2022-12-05 RX ORDER — BUTALBITAL, ACETAMINOPHEN AND CAFFEINE 50; 325; 40 MG/1; MG/1; MG/1
1 TABLET ORAL EVERY 4 HOURS PRN
Qty: 30 TABLET | Refills: 0 | Status: SHIPPED | OUTPATIENT
Start: 2022-12-05 | End: 2022-12-19 | Stop reason: SDUPTHER

## 2022-12-19 ENCOUNTER — OFFICE VISIT (OUTPATIENT)
Dept: OBSTETRICS AND GYNECOLOGY | Facility: CLINIC | Age: 37
End: 2022-12-19
Payer: MEDICAID

## 2022-12-19 VITALS — WEIGHT: 161.63 LBS | BODY MASS INDEX: 21.92 KG/M2

## 2022-12-19 DIAGNOSIS — R51.9 NONINTRACTABLE HEADACHE, UNSPECIFIED CHRONICITY PATTERN, UNSPECIFIED HEADACHE TYPE: ICD-10-CM

## 2022-12-19 PROCEDURE — 1160F PR REVIEW ALL MEDS BY PRESCRIBER/CLIN PHARMACIST DOCUMENTED: ICD-10-PCS | Mod: CPTII,,, | Performed by: OBSTETRICS & GYNECOLOGY

## 2022-12-19 PROCEDURE — 3008F PR BODY MASS INDEX (BMI) DOCUMENTED: ICD-10-PCS | Mod: CPTII,,, | Performed by: OBSTETRICS & GYNECOLOGY

## 2022-12-19 PROCEDURE — 99204 OFFICE O/P NEW MOD 45 MIN: CPT | Mod: S$PBB,TH,, | Performed by: OBSTETRICS & GYNECOLOGY

## 2022-12-19 PROCEDURE — 1159F MED LIST DOCD IN RCRD: CPT | Mod: CPTII,,, | Performed by: OBSTETRICS & GYNECOLOGY

## 2022-12-19 PROCEDURE — 1160F RVW MEDS BY RX/DR IN RCRD: CPT | Mod: CPTII,,, | Performed by: OBSTETRICS & GYNECOLOGY

## 2022-12-19 PROCEDURE — 99999 PR PBB SHADOW E&M-EST. PATIENT-LVL II: CPT | Mod: PBBFAC,,, | Performed by: OBSTETRICS & GYNECOLOGY

## 2022-12-19 PROCEDURE — 1159F PR MEDICATION LIST DOCUMENTED IN MEDICAL RECORD: ICD-10-PCS | Mod: CPTII,,, | Performed by: OBSTETRICS & GYNECOLOGY

## 2022-12-19 PROCEDURE — 99999 PR PBB SHADOW E&M-EST. PATIENT-LVL II: ICD-10-PCS | Mod: PBBFAC,,, | Performed by: OBSTETRICS & GYNECOLOGY

## 2022-12-19 PROCEDURE — 3008F BODY MASS INDEX DOCD: CPT | Mod: CPTII,,, | Performed by: OBSTETRICS & GYNECOLOGY

## 2022-12-19 PROCEDURE — 99212 OFFICE O/P EST SF 10 MIN: CPT | Mod: PBBFAC,PO | Performed by: OBSTETRICS & GYNECOLOGY

## 2022-12-19 PROCEDURE — 99204 PR OFFICE/OUTPT VISIT, NEW, LEVL IV, 45-59 MIN: ICD-10-PCS | Mod: S$PBB,TH,, | Performed by: OBSTETRICS & GYNECOLOGY

## 2022-12-19 RX ORDER — BUTALBITAL, ACETAMINOPHEN AND CAFFEINE 50; 325; 40 MG/1; MG/1; MG/1
1 TABLET ORAL EVERY 4 HOURS PRN
Qty: 30 TABLET | Refills: 0 | Status: SHIPPED | OUTPATIENT
Start: 2022-12-19 | End: 2023-01-09 | Stop reason: SDUPTHER

## 2022-12-27 ENCOUNTER — PATIENT MESSAGE (OUTPATIENT)
Dept: OBSTETRICS AND GYNECOLOGY | Facility: CLINIC | Age: 37
End: 2022-12-27
Payer: MEDICAID

## 2022-12-28 ENCOUNTER — TELEPHONE (OUTPATIENT)
Dept: OBSTETRICS AND GYNECOLOGY | Facility: CLINIC | Age: 37
End: 2022-12-28
Payer: MEDICAID

## 2022-12-28 NOTE — TELEPHONE ENCOUNTER
----- Message from Estevan Slater sent at 12/27/2022 11:46 AM CST -----  Contact: Pt  .Type: Medical Clearance    Who Called: Pt  Would the patient rather a call back? Call  Best Call Back Number: [phone]  Additional Information:   Pt is at the Dentist Office and needs a medical clearance for procedure. (First time coming)  Pt is requesting to speak to a nurse.

## 2023-01-09 ENCOUNTER — PATIENT MESSAGE (OUTPATIENT)
Dept: OBSTETRICS AND GYNECOLOGY | Facility: CLINIC | Age: 38
End: 2023-01-09

## 2023-01-09 ENCOUNTER — ROUTINE PRENATAL (OUTPATIENT)
Dept: OBSTETRICS AND GYNECOLOGY | Facility: CLINIC | Age: 38
End: 2023-01-09
Payer: MEDICAID

## 2023-01-09 VITALS
BODY MASS INDEX: 23.38 KG/M2 | DIASTOLIC BLOOD PRESSURE: 86 MMHG | SYSTOLIC BLOOD PRESSURE: 124 MMHG | WEIGHT: 172.38 LBS

## 2023-01-09 DIAGNOSIS — R51.9 NONINTRACTABLE HEADACHE, UNSPECIFIED CHRONICITY PATTERN, UNSPECIFIED HEADACHE TYPE: ICD-10-CM

## 2023-01-09 DIAGNOSIS — Z3A.31 31 WEEKS GESTATION OF PREGNANCY: Primary | ICD-10-CM

## 2023-01-09 PROCEDURE — 99999 PR PBB SHADOW E&M-EST. PATIENT-LVL II: CPT | Mod: PBBFAC,,, | Performed by: OBSTETRICS & GYNECOLOGY

## 2023-01-09 PROCEDURE — 0502F PR SUBSEQUENT PRENATAL CARE: ICD-10-PCS | Mod: S$PBB,,, | Performed by: OBSTETRICS & GYNECOLOGY

## 2023-01-09 PROCEDURE — 99999 PR PBB SHADOW E&M-EST. PATIENT-LVL II: ICD-10-PCS | Mod: PBBFAC,,, | Performed by: OBSTETRICS & GYNECOLOGY

## 2023-01-09 PROCEDURE — 99212 OFFICE O/P EST SF 10 MIN: CPT | Mod: PBBFAC,PO | Performed by: OBSTETRICS & GYNECOLOGY

## 2023-01-09 PROCEDURE — 0502F SUBSEQUENT PRENATAL CARE: CPT | Mod: S$PBB,,, | Performed by: OBSTETRICS & GYNECOLOGY

## 2023-01-09 RX ORDER — HYDROCODONE BITARTRATE AND ACETAMINOPHEN 5; 325 MG/1; MG/1
1 TABLET ORAL EVERY 6 HOURS PRN
Qty: 20 TABLET | Refills: 0 | Status: ON HOLD | OUTPATIENT
Start: 2023-01-09 | End: 2023-03-06 | Stop reason: HOSPADM

## 2023-01-09 RX ORDER — BUTALBITAL, ACETAMINOPHEN AND CAFFEINE 50; 325; 40 MG/1; MG/1; MG/1
1 TABLET ORAL EVERY 4 HOURS PRN
Qty: 30 TABLET | Refills: 0 | Status: SHIPPED | OUTPATIENT
Start: 2023-01-09 | End: 2023-02-08

## 2023-01-14 ENCOUNTER — PATIENT MESSAGE (OUTPATIENT)
Dept: OTHER | Facility: OTHER | Age: 38
End: 2023-01-14
Payer: MEDICAID

## 2023-01-23 ENCOUNTER — PATIENT MESSAGE (OUTPATIENT)
Dept: OBSTETRICS AND GYNECOLOGY | Facility: CLINIC | Age: 38
End: 2023-01-23

## 2023-01-27 ENCOUNTER — ROUTINE PRENATAL (OUTPATIENT)
Dept: OBSTETRICS AND GYNECOLOGY | Facility: CLINIC | Age: 38
End: 2023-01-27
Payer: MEDICAID

## 2023-01-27 VITALS
SYSTOLIC BLOOD PRESSURE: 132 MMHG | WEIGHT: 175.94 LBS | BODY MASS INDEX: 23.86 KG/M2 | DIASTOLIC BLOOD PRESSURE: 84 MMHG

## 2023-01-27 DIAGNOSIS — Z3A.33 33 WEEKS GESTATION OF PREGNANCY: Primary | ICD-10-CM

## 2023-01-27 DIAGNOSIS — Z3A.39 39 WEEKS GESTATION OF PREGNANCY: ICD-10-CM

## 2023-01-27 PROCEDURE — 99999 PR PBB SHADOW E&M-EST. PATIENT-LVL II: ICD-10-PCS | Mod: PBBFAC,,, | Performed by: OBSTETRICS & GYNECOLOGY

## 2023-01-27 PROCEDURE — 99212 OFFICE O/P EST SF 10 MIN: CPT | Mod: PBBFAC,PN | Performed by: OBSTETRICS & GYNECOLOGY

## 2023-01-27 PROCEDURE — 99213 OFFICE O/P EST LOW 20 MIN: CPT | Mod: S$PBB,TH,, | Performed by: OBSTETRICS & GYNECOLOGY

## 2023-01-27 PROCEDURE — 99213 PR OFFICE/OUTPT VISIT, EST, LEVL III, 20-29 MIN: ICD-10-PCS | Mod: S$PBB,TH,, | Performed by: OBSTETRICS & GYNECOLOGY

## 2023-01-27 PROCEDURE — 99999 PR PBB SHADOW E&M-EST. PATIENT-LVL II: CPT | Mod: PBBFAC,,, | Performed by: OBSTETRICS & GYNECOLOGY

## 2023-01-27 NOTE — PROGRESS NOTES
Doing ok, saw dentist doing better has another appt    fht's 140's    Wants IOL adoptive parents coming from [place]  Scheduled for 3/7 at 4am  Wants a rm for adoptive parents to do skin to skin    Patient with no complaints. Denies vaginal bleeding or contractions. Good FM. RTC in 2 weeks.     Vitals signs, FHTs, urine dip, and PE findings documented, reviewed and available in OB flow chart.       I spent a total of 20 minutes on the day of the visit.This includes face to face time and non-face to face time preparing to see the patient (eg, review of tests), Obtaining and/or reviewing separately obtained history, Documenting clinical information in the electronic or other health record, Independently interpreting resultsand communicating results to the patient/family/caregiver, or Care coordination.

## 2023-02-01 ENCOUNTER — PATIENT MESSAGE (OUTPATIENT)
Dept: OBSTETRICS AND GYNECOLOGY | Facility: CLINIC | Age: 38
End: 2023-02-01
Payer: MEDICAID

## 2023-02-02 RX ORDER — BUTALBITAL, ACETAMINOPHEN AND CAFFEINE 50; 325; 40 MG/1; MG/1; MG/1
1 TABLET ORAL EVERY 4 HOURS PRN
Qty: 30 TABLET | Refills: 0 | Status: SHIPPED | OUTPATIENT
Start: 2023-02-02 | End: 2023-02-20 | Stop reason: SDUPTHER

## 2023-02-04 ENCOUNTER — PATIENT MESSAGE (OUTPATIENT)
Dept: OTHER | Facility: OTHER | Age: 38
End: 2023-02-04
Payer: MEDICAID

## 2023-02-16 ENCOUNTER — ROUTINE PRENATAL (OUTPATIENT)
Dept: OBSTETRICS AND GYNECOLOGY | Facility: CLINIC | Age: 38
End: 2023-02-16
Payer: MEDICAID

## 2023-02-16 ENCOUNTER — PATIENT MESSAGE (OUTPATIENT)
Dept: OBSTETRICS AND GYNECOLOGY | Facility: CLINIC | Age: 38
End: 2023-02-16

## 2023-02-16 VITALS — SYSTOLIC BLOOD PRESSURE: 137 MMHG | DIASTOLIC BLOOD PRESSURE: 88 MMHG

## 2023-02-16 DIAGNOSIS — Z3A.36 36 WEEKS GESTATION OF PREGNANCY: Primary | ICD-10-CM

## 2023-02-16 PROCEDURE — 99213 OFFICE O/P EST LOW 20 MIN: CPT | Mod: TH,S$PBB,, | Performed by: OBSTETRICS & GYNECOLOGY

## 2023-02-16 PROCEDURE — 99999 PR PBB SHADOW E&M-EST. PATIENT-LVL II: CPT | Mod: PBBFAC,,, | Performed by: OBSTETRICS & GYNECOLOGY

## 2023-02-16 PROCEDURE — 99212 OFFICE O/P EST SF 10 MIN: CPT | Mod: PBBFAC,PN | Performed by: OBSTETRICS & GYNECOLOGY

## 2023-02-16 PROCEDURE — 99213 PR OFFICE/OUTPT VISIT, EST, LEVL III, 20-29 MIN: ICD-10-PCS | Mod: TH,S$PBB,, | Performed by: OBSTETRICS & GYNECOLOGY

## 2023-02-16 PROCEDURE — 99999 PR PBB SHADOW E&M-EST. PATIENT-LVL II: ICD-10-PCS | Mod: PBBFAC,,, | Performed by: OBSTETRICS & GYNECOLOGY

## 2023-02-16 PROCEDURE — 87081 CULTURE SCREEN ONLY: CPT | Performed by: OBSTETRICS & GYNECOLOGY

## 2023-02-16 NOTE — PROGRESS NOTES
doingok  fht's 140's  cx 2/70/-2  GBBS done  rtc 2wks in [facility]    Patient with no complaints. Denies vaginal bleeding or contractions. Good FM. GBS collected today. Labor precautions discused with patient. RTC in 1 week.     Vitals signs, FHTs, urine dip, and PE findings documented, reviewed and available in OB flow chart.       I spent a total of 20 minutes on the day of the visit.This includes face to face time and non-face to face time preparing to see the patient (eg, review of tests), Obtaining and/or reviewing separately obtained history, Documenting clinical information in the electronic or other health record, Independently interpreting resultsand communicating results to the patient/family/caregiver, or Care coordination.        1 Principal Discharge DX:	Opiate withdrawal

## 2023-02-20 ENCOUNTER — PATIENT MESSAGE (OUTPATIENT)
Dept: OBSTETRICS AND GYNECOLOGY | Facility: CLINIC | Age: 38
End: 2023-02-20
Payer: MEDICAID

## 2023-02-20 LAB — BACTERIA SPEC AEROBE CULT: NORMAL

## 2023-02-27 ENCOUNTER — PATIENT MESSAGE (OUTPATIENT)
Dept: OBSTETRICS AND GYNECOLOGY | Facility: CLINIC | Age: 38
End: 2023-02-27
Payer: MEDICAID

## 2023-03-02 ENCOUNTER — ROUTINE PRENATAL (OUTPATIENT)
Dept: OBSTETRICS AND GYNECOLOGY | Facility: CLINIC | Age: 38
End: 2023-03-02
Payer: MEDICAID

## 2023-03-02 VITALS — WEIGHT: 188 LBS | BODY MASS INDEX: 25.5 KG/M2 | SYSTOLIC BLOOD PRESSURE: 136 MMHG | DIASTOLIC BLOOD PRESSURE: 78 MMHG

## 2023-03-02 DIAGNOSIS — Z3A.39 39 WEEKS GESTATION OF PREGNANCY: Primary | ICD-10-CM

## 2023-03-02 PROCEDURE — 99213 PR OFFICE/OUTPT VISIT, EST, LEVL III, 20-29 MIN: ICD-10-PCS | Mod: TH,S$PBB,, | Performed by: OBSTETRICS & GYNECOLOGY

## 2023-03-02 PROCEDURE — 99213 OFFICE O/P EST LOW 20 MIN: CPT | Mod: TH,S$PBB,, | Performed by: OBSTETRICS & GYNECOLOGY

## 2023-03-02 PROCEDURE — 99999 PR PBB SHADOW E&M-EST. PATIENT-LVL II: ICD-10-PCS | Mod: PBBFAC,,, | Performed by: OBSTETRICS & GYNECOLOGY

## 2023-03-02 PROCEDURE — 99212 OFFICE O/P EST SF 10 MIN: CPT | Mod: PBBFAC,PN | Performed by: OBSTETRICS & GYNECOLOGY

## 2023-03-02 PROCEDURE — 99999 PR PBB SHADOW E&M-EST. PATIENT-LVL II: CPT | Mod: PBBFAC,,, | Performed by: OBSTETRICS & GYNECOLOGY

## 2023-03-02 NOTE — PROGRESS NOTES
Doing well, occ ctx's  IOL scheduled for Tues am at 5am  fht's 140's  cx 2-3/patulous, -3 vtx intact  Patient with no complaints. Denies vaginal bleeding or contractions. Good FM. RTC in 1 week.     Vitals signs, FHTs, urine dip, and PE findings documented, reviewed and available in OB flow chart.       I spent a total of 20 minutes on the day of the visit.This includes face to face time and non-face to face time preparing to see the patient (eg, review of tests), Obtaining and/or reviewing separately obtained history, Documenting clinical information in the electronic or other health record, Independently interpreting resultsand communicating results to the patient/family/caregiver, or Care coordination.

## 2023-03-05 ENCOUNTER — HOSPITAL ENCOUNTER (INPATIENT)
Facility: HOSPITAL | Age: 38
LOS: 1 days | Discharge: HOME | End: 2023-03-06
Attending: INTERNAL MEDICINE | Admitting: OBSTETRICS & GYNECOLOGY
Payer: MEDICAID

## 2023-03-05 DIAGNOSIS — Z34.90 PREGNANCY: ICD-10-CM

## 2023-03-05 DIAGNOSIS — O47.9 UTERINE CONTRACTIONS DURING PREGNANCY: ICD-10-CM

## 2023-03-05 PROCEDURE — G0378 HOSPITAL OBSERVATION PER HR: HCPCS

## 2023-03-06 ENCOUNTER — CLINICAL SUPPORT (OUTPATIENT)
Dept: SMOKING CESSATION | Facility: CLINIC | Age: 38
End: 2023-03-06

## 2023-03-06 ENCOUNTER — ANESTHESIA (OUTPATIENT)
Dept: OBSTETRICS AND GYNECOLOGY | Facility: HOSPITAL | Age: 38
End: 2023-03-06
Payer: MEDICAID

## 2023-03-06 ENCOUNTER — ANESTHESIA EVENT (OUTPATIENT)
Dept: OBSTETRICS AND GYNECOLOGY | Facility: HOSPITAL | Age: 38
End: 2023-03-06
Payer: MEDICAID

## 2023-03-06 ENCOUNTER — TELEPHONE (OUTPATIENT)
Dept: OBSTETRICS AND GYNECOLOGY | Facility: CLINIC | Age: 38
End: 2023-03-06
Payer: MEDICAID

## 2023-03-06 ENCOUNTER — PATIENT MESSAGE (OUTPATIENT)
Dept: OBSTETRICS AND GYNECOLOGY | Facility: CLINIC | Age: 38
End: 2023-03-06
Payer: MEDICAID

## 2023-03-06 DIAGNOSIS — F17.210 CIGARETTE SMOKER: Primary | ICD-10-CM

## 2023-03-06 LAB
ABO + RH BLD: NORMAL
BASOPHILS # BLD AUTO: 0.03 K/UL (ref 0–0.2)
BASOPHILS NFR BLD: 0.2 % (ref 0–1.9)
BLD GP AB SCN CELLS X3 SERPL QL: NORMAL
DIFFERENTIAL METHOD: ABNORMAL
EOSINOPHIL # BLD AUTO: 0.1 K/UL (ref 0–0.5)
EOSINOPHIL NFR BLD: 1 % (ref 0–8)
ERYTHROCYTE [DISTWIDTH] IN BLOOD BY AUTOMATED COUNT: 13.7 % (ref 11.5–14.5)
ERYTHROCYTE [DISTWIDTH] IN BLOOD BY AUTOMATED COUNT: 13.8 % (ref 11.5–14.5)
HCT VFR BLD AUTO: 26.8 % (ref 37–48.5)
HCT VFR BLD AUTO: 28.2 % (ref 37–48.5)
HGB BLD-MCNC: 8.5 G/DL (ref 12–16)
HGB BLD-MCNC: 9 G/DL (ref 12–16)
IMM GRANULOCYTES # BLD AUTO: 0.11 K/UL (ref 0–0.04)
IMM GRANULOCYTES NFR BLD AUTO: 0.8 % (ref 0–0.5)
LYMPHOCYTES # BLD AUTO: 2.4 K/UL (ref 1–4.8)
LYMPHOCYTES NFR BLD: 18.1 % (ref 18–48)
MCH RBC QN AUTO: 25.1 PG (ref 27–31)
MCH RBC QN AUTO: 25.4 PG (ref 27–31)
MCHC RBC AUTO-ENTMCNC: 31.7 G/DL (ref 32–36)
MCHC RBC AUTO-ENTMCNC: 31.9 G/DL (ref 32–36)
MCV RBC AUTO: 79 FL (ref 82–98)
MCV RBC AUTO: 79 FL (ref 82–98)
MONOCYTES # BLD AUTO: 0.9 K/UL (ref 0.3–1)
MONOCYTES NFR BLD: 6.8 % (ref 4–15)
NEUTROPHILS # BLD AUTO: 9.5 K/UL (ref 1.8–7.7)
NEUTROPHILS NFR BLD: 73.1 % (ref 38–73)
NRBC BLD-RTO: 0 /100 WBC
PLATELET # BLD AUTO: 265 K/UL (ref 150–450)
PLATELET # BLD AUTO: 292 K/UL (ref 150–450)
PMV BLD AUTO: 9.7 FL (ref 9.2–12.9)
PMV BLD AUTO: 9.8 FL (ref 9.2–12.9)
RBC # BLD AUTO: 3.38 M/UL (ref 4–5.4)
RBC # BLD AUTO: 3.55 M/UL (ref 4–5.4)
WBC # BLD AUTO: 13.01 K/UL (ref 3.9–12.7)
WBC # BLD AUTO: 15.08 K/UL (ref 3.9–12.7)

## 2023-03-06 PROCEDURE — 25000003 PHARM REV CODE 250: Performed by: OBSTETRICS & GYNECOLOGY

## 2023-03-06 PROCEDURE — 59409 OBSTETRICAL CARE: CPT | Mod: QY,ANES,, | Performed by: ANESTHESIOLOGY

## 2023-03-06 PROCEDURE — 62326 NJX INTERLAMINAR LMBR/SAC: CPT | Performed by: NURSE ANESTHETIST, CERTIFIED REGISTERED

## 2023-03-06 PROCEDURE — 25000003 PHARM REV CODE 250: Performed by: NURSE ANESTHETIST, CERTIFIED REGISTERED

## 2023-03-06 PROCEDURE — 27200710 HC EPIDURAL INFUSION PUMP SET: Performed by: ANESTHESIOLOGY

## 2023-03-06 PROCEDURE — 72200004 HC VAGINAL DELIVERY LEVEL I

## 2023-03-06 PROCEDURE — 99406 BEHAV CHNG SMOKING 3-10 MIN: CPT | Mod: S$GLB,,,

## 2023-03-06 PROCEDURE — 86900 BLOOD TYPING SEROLOGIC ABO: CPT | Performed by: OBSTETRICS & GYNECOLOGY

## 2023-03-06 PROCEDURE — 72100002 HC LABOR CARE, 1ST 8 HOURS

## 2023-03-06 PROCEDURE — 25000003 PHARM REV CODE 250: Performed by: STUDENT IN AN ORGANIZED HEALTH CARE EDUCATION/TRAINING PROGRAM

## 2023-03-06 PROCEDURE — 99231 PR SUBSEQUENT HOSPITAL CARE,LEVL I: ICD-10-PCS | Mod: ,,, | Performed by: STUDENT IN AN ORGANIZED HEALTH CARE EDUCATION/TRAINING PROGRAM

## 2023-03-06 PROCEDURE — 36415 COLL VENOUS BLD VENIPUNCTURE: CPT | Performed by: OBSTETRICS & GYNECOLOGY

## 2023-03-06 PROCEDURE — 63600175 PHARM REV CODE 636 W HCPCS: Performed by: OBSTETRICS & GYNECOLOGY

## 2023-03-06 PROCEDURE — 85025 COMPLETE CBC W/AUTO DIFF WBC: CPT | Performed by: OBSTETRICS & GYNECOLOGY

## 2023-03-06 PROCEDURE — 59409 PRA ETRICAL CARE,VAG DELIV ONLY: ICD-10-PCS | Mod: QY,ANES,, | Performed by: ANESTHESIOLOGY

## 2023-03-06 PROCEDURE — 36415 COLL VENOUS BLD VENIPUNCTURE: CPT | Performed by: STUDENT IN AN ORGANIZED HEALTH CARE EDUCATION/TRAINING PROGRAM

## 2023-03-06 PROCEDURE — 51702 INSERT TEMP BLADDER CATH: CPT

## 2023-03-06 PROCEDURE — 59409 PRA ETRICAL CARE,VAG DELIV ONLY: ICD-10-PCS | Mod: QX,CRNA,, | Performed by: NURSE ANESTHETIST, CERTIFIED REGISTERED

## 2023-03-06 PROCEDURE — 85027 COMPLETE CBC AUTOMATED: CPT | Performed by: STUDENT IN AN ORGANIZED HEALTH CARE EDUCATION/TRAINING PROGRAM

## 2023-03-06 PROCEDURE — 99406 PT REFUSED TOBACCO CESSATION: ICD-10-PCS | Mod: S$GLB,,,

## 2023-03-06 PROCEDURE — C1751 CATH, INF, PER/CENT/MIDLINE: HCPCS | Performed by: ANESTHESIOLOGY

## 2023-03-06 PROCEDURE — 99231 SBSQ HOSP IP/OBS SF/LOW 25: CPT | Mod: ,,, | Performed by: STUDENT IN AN ORGANIZED HEALTH CARE EDUCATION/TRAINING PROGRAM

## 2023-03-06 PROCEDURE — 59409 OBSTETRICAL CARE: CPT | Mod: QX,CRNA,, | Performed by: NURSE ANESTHETIST, CERTIFIED REGISTERED

## 2023-03-06 PROCEDURE — 11000001 HC ACUTE MED/SURG PRIVATE ROOM

## 2023-03-06 RX ORDER — OXYTOCIN/RINGER'S LACTATE 30/500 ML
95 PLASTIC BAG, INJECTION (ML) INTRAVENOUS ONCE
Status: DISCONTINUED | OUTPATIENT
Start: 2023-03-06 | End: 2023-03-06 | Stop reason: HOSPADM

## 2023-03-06 RX ORDER — TRANEXAMIC ACID 10 MG/ML
1000 INJECTION, SOLUTION INTRAVENOUS ONCE AS NEEDED
Status: DISCONTINUED | OUTPATIENT
Start: 2023-03-06 | End: 2023-03-06 | Stop reason: HOSPADM

## 2023-03-06 RX ORDER — SODIUM CHLORIDE, SODIUM LACTATE, POTASSIUM CHLORIDE, CALCIUM CHLORIDE 600; 310; 30; 20 MG/100ML; MG/100ML; MG/100ML; MG/100ML
INJECTION, SOLUTION INTRAVENOUS CONTINUOUS
Status: DISCONTINUED | OUTPATIENT
Start: 2023-03-06 | End: 2023-03-06

## 2023-03-06 RX ORDER — IBUPROFEN 600 MG/1
600 TABLET ORAL EVERY 6 HOURS
Qty: 60 TABLET | Refills: 1 | Status: ON HOLD | OUTPATIENT
Start: 2023-03-06 | End: 2023-04-24 | Stop reason: HOSPADM

## 2023-03-06 RX ORDER — METHYLERGONOVINE MALEATE 0.2 MG/ML
INJECTION INTRAVENOUS
Status: DISPENSED
Start: 2023-03-06 | End: 2023-03-06

## 2023-03-06 RX ORDER — OXYTOCIN/RINGER'S LACTATE 30/500 ML
334 PLASTIC BAG, INJECTION (ML) INTRAVENOUS ONCE
Status: DISCONTINUED | OUTPATIENT
Start: 2023-03-06 | End: 2023-03-06

## 2023-03-06 RX ORDER — ACETAMINOPHEN 325 MG/1
650 TABLET ORAL EVERY 6 HOURS PRN
Status: DISCONTINUED | OUTPATIENT
Start: 2023-03-06 | End: 2023-03-06 | Stop reason: HOSPADM

## 2023-03-06 RX ORDER — OXYTOCIN 10 [USP'U]/ML
10 INJECTION, SOLUTION INTRAMUSCULAR; INTRAVENOUS ONCE AS NEEDED
Status: DISCONTINUED | OUTPATIENT
Start: 2023-03-06 | End: 2023-03-06 | Stop reason: HOSPADM

## 2023-03-06 RX ORDER — SIMETHICONE 80 MG
1 TABLET,CHEWABLE ORAL EVERY 6 HOURS PRN
Status: DISCONTINUED | OUTPATIENT
Start: 2023-03-06 | End: 2023-03-06 | Stop reason: HOSPADM

## 2023-03-06 RX ORDER — CARBOPROST TROMETHAMINE 250 UG/ML
250 INJECTION, SOLUTION INTRAMUSCULAR
Status: DISCONTINUED | OUTPATIENT
Start: 2023-03-06 | End: 2023-03-06

## 2023-03-06 RX ORDER — FENTANYL/BUPIVACAINE/NS/PF 2MCG/ML-.1
PLASTIC BAG, INJECTION (ML) INJECTION CONTINUOUS
Status: DISCONTINUED | OUTPATIENT
Start: 2023-03-06 | End: 2023-03-06

## 2023-03-06 RX ORDER — OXYTOCIN/RINGER'S LACTATE 30/500 ML
334 PLASTIC BAG, INJECTION (ML) INTRAVENOUS ONCE AS NEEDED
Status: DISCONTINUED | OUTPATIENT
Start: 2023-03-06 | End: 2023-03-06 | Stop reason: HOSPADM

## 2023-03-06 RX ORDER — HYDROCORTISONE 25 MG/G
CREAM TOPICAL 3 TIMES DAILY PRN
Status: DISCONTINUED | OUTPATIENT
Start: 2023-03-06 | End: 2023-03-06 | Stop reason: HOSPADM

## 2023-03-06 RX ORDER — SODIUM CHLORIDE 0.9 % (FLUSH) 0.9 %
10 SYRINGE (ML) INJECTION
Status: DISCONTINUED | OUTPATIENT
Start: 2023-03-06 | End: 2023-03-06 | Stop reason: HOSPADM

## 2023-03-06 RX ORDER — IBUPROFEN 600 MG/1
600 TABLET ORAL EVERY 6 HOURS
Status: DISCONTINUED | OUTPATIENT
Start: 2023-03-06 | End: 2023-03-06 | Stop reason: HOSPADM

## 2023-03-06 RX ORDER — PROCHLORPERAZINE EDISYLATE 5 MG/ML
5 INJECTION INTRAMUSCULAR; INTRAVENOUS EVERY 6 HOURS PRN
Status: DISCONTINUED | OUTPATIENT
Start: 2023-03-06 | End: 2023-03-06 | Stop reason: HOSPADM

## 2023-03-06 RX ORDER — MISOPROSTOL 200 UG/1
800 TABLET ORAL ONCE AS NEEDED
Status: DISCONTINUED | OUTPATIENT
Start: 2023-03-06 | End: 2023-03-06 | Stop reason: HOSPADM

## 2023-03-06 RX ORDER — LIDOCAINE HYDROCHLORIDE 10 MG/ML
10 INJECTION INFILTRATION; PERINEURAL ONCE AS NEEDED
Status: DISCONTINUED | OUTPATIENT
Start: 2023-03-06 | End: 2023-03-06

## 2023-03-06 RX ORDER — BUPIVACAINE HYDROCHLORIDE 2.5 MG/ML
INJECTION, SOLUTION INFILTRATION; PERINEURAL
Status: DISCONTINUED | OUTPATIENT
Start: 2023-03-06 | End: 2023-03-06

## 2023-03-06 RX ORDER — ONDANSETRON 8 MG/1
8 TABLET, ORALLY DISINTEGRATING ORAL EVERY 8 HOURS PRN
Status: DISCONTINUED | OUTPATIENT
Start: 2023-03-06 | End: 2023-03-06

## 2023-03-06 RX ORDER — METHYLERGONOVINE MALEATE 0.2 MG/ML
200 INJECTION INTRAVENOUS
Status: DISCONTINUED | OUTPATIENT
Start: 2023-03-06 | End: 2023-03-06 | Stop reason: HOSPADM

## 2023-03-06 RX ORDER — SIMETHICONE 80 MG
1 TABLET,CHEWABLE ORAL 4 TIMES DAILY PRN
Status: DISCONTINUED | OUTPATIENT
Start: 2023-03-06 | End: 2023-03-06

## 2023-03-06 RX ORDER — TRANEXAMIC ACID 10 MG/ML
1000 INJECTION, SOLUTION INTRAVENOUS ONCE AS NEEDED
Status: DISCONTINUED | OUTPATIENT
Start: 2023-03-06 | End: 2023-03-06

## 2023-03-06 RX ORDER — OXYTOCIN/RINGER'S LACTATE 30/500 ML
95 PLASTIC BAG, INJECTION (ML) INTRAVENOUS ONCE AS NEEDED
Status: DISCONTINUED | OUTPATIENT
Start: 2023-03-06 | End: 2023-03-06 | Stop reason: HOSPADM

## 2023-03-06 RX ORDER — OXYTOCIN/RINGER'S LACTATE 30/500 ML
95 PLASTIC BAG, INJECTION (ML) INTRAVENOUS ONCE
Status: DISCONTINUED | OUTPATIENT
Start: 2023-03-06 | End: 2023-03-06

## 2023-03-06 RX ORDER — MUPIROCIN 20 MG/G
OINTMENT TOPICAL
Status: DISCONTINUED | OUTPATIENT
Start: 2023-03-06 | End: 2023-03-06

## 2023-03-06 RX ORDER — CARBOPROST TROMETHAMINE 250 UG/ML
250 INJECTION, SOLUTION INTRAMUSCULAR
Status: DISCONTINUED | OUTPATIENT
Start: 2023-03-06 | End: 2023-03-06 | Stop reason: HOSPADM

## 2023-03-06 RX ORDER — METHYLERGONOVINE MALEATE 0.2 MG/ML
200 INJECTION INTRAVENOUS
Status: DISCONTINUED | OUTPATIENT
Start: 2023-03-06 | End: 2023-03-06

## 2023-03-06 RX ORDER — PROCHLORPERAZINE EDISYLATE 5 MG/ML
5 INJECTION INTRAMUSCULAR; INTRAVENOUS EVERY 6 HOURS PRN
Status: DISCONTINUED | OUTPATIENT
Start: 2023-03-06 | End: 2023-03-06

## 2023-03-06 RX ORDER — DIPHENHYDRAMINE HYDROCHLORIDE 50 MG/ML
25 INJECTION INTRAMUSCULAR; INTRAVENOUS EVERY 4 HOURS PRN
Status: DISCONTINUED | OUTPATIENT
Start: 2023-03-06 | End: 2023-03-06 | Stop reason: HOSPADM

## 2023-03-06 RX ORDER — ONDANSETRON 8 MG/1
8 TABLET, ORALLY DISINTEGRATING ORAL EVERY 8 HOURS PRN
Status: DISCONTINUED | OUTPATIENT
Start: 2023-03-06 | End: 2023-03-06 | Stop reason: HOSPADM

## 2023-03-06 RX ORDER — OXYCODONE AND ACETAMINOPHEN 5; 325 MG/1; MG/1
1 TABLET ORAL EVERY 4 HOURS PRN
Status: DISCONTINUED | OUTPATIENT
Start: 2023-03-06 | End: 2023-03-06 | Stop reason: HOSPADM

## 2023-03-06 RX ORDER — BUPIVACAINE HYDROCHLORIDE 2.5 MG/ML
INJECTION, SOLUTION EPIDURAL; INFILTRATION; INTRACAUDAL
Status: DISPENSED
Start: 2023-03-06 | End: 2023-03-06

## 2023-03-06 RX ORDER — MEDROXYPROGESTERONE ACETATE 150 MG/ML
150 INJECTION, SUSPENSION INTRAMUSCULAR ONCE
Status: COMPLETED | OUTPATIENT
Start: 2023-03-06 | End: 2023-03-06

## 2023-03-06 RX ORDER — DIPHENOXYLATE HYDROCHLORIDE AND ATROPINE SULFATE 2.5; .025 MG/1; MG/1
1 TABLET ORAL 4 TIMES DAILY PRN
Status: DISCONTINUED | OUTPATIENT
Start: 2023-03-06 | End: 2023-03-06

## 2023-03-06 RX ORDER — CALCIUM CARBONATE 200(500)MG
500 TABLET,CHEWABLE ORAL 3 TIMES DAILY PRN
Status: DISCONTINUED | OUTPATIENT
Start: 2023-03-06 | End: 2023-03-06

## 2023-03-06 RX ORDER — MISOPROSTOL 200 UG/1
800 TABLET ORAL
Status: DISCONTINUED | OUTPATIENT
Start: 2023-03-06 | End: 2023-03-06

## 2023-03-06 RX ORDER — FENTANYL/BUPIVACAINE/NS/PF 2MCG/ML-.1
PLASTIC BAG, INJECTION (ML) INJECTION
Status: COMPLETED
Start: 2023-03-06 | End: 2023-03-06

## 2023-03-06 RX ADMIN — Medication 14 ML/HR: at 01:03

## 2023-03-06 RX ADMIN — METHYLERGONOVINE MALEATE 200 MCG: 0.2 INJECTION INTRAVENOUS at 03:03

## 2023-03-06 RX ADMIN — MEDROXYPROGESTERONE ACETATE 150 MG: 150 INJECTION, SUSPENSION, EXTENDED RELEASE INTRAMUSCULAR at 02:03

## 2023-03-06 RX ADMIN — IBUPROFEN 600 MG: 600 TABLET ORAL at 05:03

## 2023-03-06 RX ADMIN — OXYCODONE HYDROCHLORIDE AND ACETAMINOPHEN 1 TABLET: 5; 325 TABLET ORAL at 05:03

## 2023-03-06 RX ADMIN — IBUPROFEN 600 MG: 600 TABLET ORAL at 11:03

## 2023-03-06 RX ADMIN — OXYCODONE HYDROCHLORIDE AND ACETAMINOPHEN 1 TABLET: 5; 325 TABLET ORAL at 06:03

## 2023-03-06 RX ADMIN — BUPIVACAINE HYDROCHLORIDE 8 ML: 2.5 INJECTION, SOLUTION INFILTRATION; PERINEURAL at 01:03

## 2023-03-06 NOTE — PLAN OF CARE
Note copied from Infant's chart (MRN: [id number])     SOCIAL WORK DISCHARGE PLANNING ASSESSMENT     Sw completed discharge planning assessment with pt's biological mother in biological mother's room K307. Pt's biological mother was easily engaged and education on the role of  was provided. Emotional support was provided throughout assessment. Pt's biological mother has a planned adoption with adoption  Onel and [facility] agency. Hospital plan with biological mother's wishes was not obtained; pt's biological mother stated she is ready to discharge. Pt's biological mother was encouraged to call with any questions or concerns. Pt's biological mother verbalized understanding.         Legal Name: Gina  :  3/6/2021  Address: [address] [place] [address] Dr Christiane LAMAS [id number] (pt's biological mother)   Parent's Phone Numbers: pt's biological mother Greer [phone]     Pediatrician:  Adoptive parents will notify staff when name of physician is obtained.      Education: Information given on NICU Education Classes; Physician/NNP daily rounds; and Postpartum Depression signs.   Potential Eligibility for SSI Benefits: No            Patient Active Problem List   Diagnosis    Term  delivered vaginally, current hospitalization    Pregnancy with adoption planned            Birth Hospital:[facility]           ERA: 23     Birth Weight:   3.26 kg (7 lb 3 oz)                   Birth Length: 50cm                  Gestational Age: 39w2d           Apgars    Living status: Living  Apgars:  1 min.:  5 min.:  10 min.:  15 min.:  20 min.:    Skin color:  1  1          Heart rate:  2  2          Reflex irritability:  2  2          Muscle tone:  2  2          Respiratory effort:  2  2          Total:  9  9                      23 1005   OB Discharge Planning Assessment   Assessment Type Discharge Planning Assessment   Source of Information family  (pt's biological mother Greer Landry  [phone])   Verified Demographic and Insurance Information Yes  (David LAMAS [id number])   Insurance Medicaid   Spiritual Affiliation Baptism   Pastoral Care/Clergy/ Contact Status none needed   Received Prenatal Care Yes   Transportation Anticipated family or friend will provide   Adoption Planned yes  ([facility])   Mother Plans for Involvement in Infant Care Following Delivery? no   Adoptive Parents Names (if known) Estella Hanna   Adoption Type closed    Involved yes   Pediatrician name of pediatrician unknown at this time   DCFS No indications (Indicators for Report)   Discharge Plan A Other  (home with adoptive parents)

## 2023-03-06 NOTE — PLAN OF CARE
Meliza informed adoption  Onel [phone] of pt's discharge this evening. Onel stated he will be present at the time of pt's discharge, so therefore pt can sign and complete release of rights paperwork.     Meliza will continue to follow.

## 2023-03-06 NOTE — PROGRESS NOTES
Postpartum Progress Note - Vaginal Delivery    Postpartum day 0    S: Patient feels well with no complaints. Pain is well-controlled with medications. Lochia decreasing. Urinating without difficulties. Tolerating a regular diet. Patient requesting to go home as soon as possible, has discharged early with previous deliveries (infant being adopted, does not desire to stay in hospital longer than needed).     Negative ROS: no headache, blurry vision, chest pain, SOB        O: Patient Vitals for the past 12 hrs:   BP Temp Temp src Pulse Resp SpO2   23 0812 (!) 153/81 98.2 °F (36.8 °C) Oral 81 16 98 %   23 0645 (!) 145/69 -- -- 93 -- --   23 0640 -- -- -- -- 16 --   23 0625 (!) 141/74 -- -- 93 -- --   23 0515 (!) 149/78 -- -- 76 -- --   23 0415 (!) 147/75 -- -- 85 -- --   23 0351 (!) 148/75 -- -- 100 -- --   23 0345 138/67 -- -- 92 -- --   23 0331 138/74 -- -- 86 -- --   23 0316 119/68 -- -- 96 -- --   23 0300 (!) 140/74 -- -- 83 -- --   23 0246 131/77 -- -- 82 -- --   23 0231 135/79 -- -- 83 -- --   23 0216 129/80 -- -- 80 -- --   23 0200 135/78 -- -- 91 -- --   23 0152 135/71 -- -- 90 -- --   23 0146 (!) 144/65 -- -- 93 -- --   23 0129 (!) 145/69 -- -- 76 -- --   23 0049 133/73 -- -- 88 -- --   23 0019 (!) 154/88 -- -- 93 -- --   23 2356 (!) 142/91 -- -- 88 -- --       General - NAD   Lungs - good respiratory effort.   Abd - +BS. Soft, non-distended, appropriately tender to palpation. Uterus firm and below U.   Ext - no LE edema or calf tenderness bilaterally.       Lab Results   Component Value Date    HCT 28.2 (L) 2023         A/P:  37 y.o.  PPD0 s/p  at 39w2d   Patient stable. VSS.    1. Continue Routine Care   - Hgb 9.0 --> pending    - MBT: O pos, Rhogam not indicated   - Rubella non-immune, recommend post partum MMR   - Contraception: depo ordered    - Discharge planned  for today PM after labs reviewed and monitoring Bps (150/80 this AM, in clinic 130s/80s)         Vilma Coronel MD

## 2023-03-06 NOTE — PLAN OF CARE
Note copied from Infant's chart (MRN: [id number])     Sw met with pt's adaptive parents in room K349. Sw answered pt's adoptive parents questions regarding pt's discharge. Sw encouraged pt's adoptive parents to call with any questions or concerns. Pt's adoptive parents verbalized understanding.      Sw will continue to follow.

## 2023-03-06 NOTE — PROGRESS NOTES
Smoking cessation education provided. Pt is a 1 pk/day cigarette smoker x 24 yrs. She states that she decreased to only a few cigarettes per day when she learned that she was pregnant. Pt declines referral to Ambulatory Smoking Cessation clinic at this time. Handout provided.

## 2023-03-06 NOTE — PLAN OF CARE
Sw met with pt in pt's room K307. Sw and pt's bedside RN assisted pt in completing the Consent for Transfer Of Physical Custody of  and Release of Liability by Single Mother consent form. Pt completed form and completion was witnessed and signed by Meliza an bedside RN. Pt also completed and signed [facility]'s Authorization for Release of Confidential Information to release information to adoptive parents Estella Hanna, adoptions agency [facility], and neisha Sykes. Pt stated the chosen name for infant is Fuentes. Pt was encouraged to call Sw with any questions or concerns. Pt verbalized understanding.     Sw provided KIRIT Fairbanks with Consent for Transfer of Physical Custody of  and Release of Liability by Single Mother consent form and [facility]'s Authorization for Release of Confidential information to be placed in infant's folder.     Meliza spoke with adoption neisha Sykes [phone]. Onel stated he will be at the hospital tomorrow to sign and complete release of rights paperwork with pt tomorrow. Onel stated either him or a representative from the [facility] agency will be present at the time of infant's discharge.     Meliza will continue to follow.

## 2023-03-06 NOTE — NURSING
Report received from Thalia BETH night shift.  Patient delivered at 0340 this AM.  Fundus firm at the umbilicus midline without massage. Lochia rubra scant.  Patient is ready to transfer to room 307. Patient is requesting to see her baby.  Nursery advised.

## 2023-03-06 NOTE — DISCHARGE INSTRUCTIONS
"Patient Discharge Instructions for Postpartum Women    Resume Regular Diet  Increase activity gradually, no heavy lifting  Shower  No tampons, douching or sexual intercourse.  Discuss birth control options with your physician.  Wear a support bra  Return to work/school when you've been cleared by a physician    Call your physician if     *Fever of 100.4 or higher  *Persistent nausea/ vomiting  *Incisional drainage  *Heavy vaginal bleeding or large clots (Heavy bleeding is soaking 1 pad in an hour)  *Swelling and pain in arms or legs  *Severe headaches, blurred vision or fainting  *Shortness of breath  *Frequency and burning with urination  *Signs of postpartum depression, discuss these signs with your physician    Call lactation services for questions regarding feeding, nipple and breast care, and general questions about lactation.  They can be reached at [phone]         Understanding Postpartum Depression    You've just had a baby.  You know you should be excited and happy.  But instead you find yourself crying for no reason.  You may have trouble coping with your daily tasks.  You feel sad, tired, and hopeless most of the time.  You may even feel ashamed or guilty.  But what you're going through is not your fault and you can feel better.  Talk to your doctor.  He or she can help.    Depression After Childbirth    You may be weepy and tired right after giving birth.  These feelings are normal.  They're sometimes called the "baby blues."  These blues go away 2-3 weeks.  However, postpartum (meaning "after birth") depression lasts much longer and is more sever than the "baby blues."  It can make you feel sad and hopeless.  You may also fear that your baby will be harmed and worry about being a bad mother.      What is Depression?    Depression is a mood disorder that affects the way you think and feel.  The most common symptom is a feeling of deep sadness.  You may also feel as if you just can't cope with life.  "   Other symptoms include:      * Gaining or loosing weight  * Sleeping too much or too little  * Feeling tired all the time  * Feeling restless  * Fears of harming your baby   * Lack of interest in your baby  * Feeling worthless or guilty  * No longer finding pleasure in things you used to  * Having trouble thinking clearly or making decisions  * Thoughts of hurting yourself or your baby    What Causes Postpartum Depression    The exact causes of postpartum depression isn't known.  It may be due to changes in your hormones during and after childbirth.  You may also be tired from caring for your baby and adjusting to being a mother.  All these factors may make you feel depressed.  In some cases, your genes may also play a role.    Depression Can Be Treated    The good news is that there are many ways to treat postpartum depression.  Talking to your doctor is the first step toward feeling better.    Resources:    * National [place] of Mental Health  -- 544.287.6442    www.nimh.nih.gov    * National [place] on Mental Illness --576.710.9323    Www.lazaro.org    * Mental Health [place] -- 291.201.8777     Www.[facility].org    * National Suicide Hotline --363.611.6389 (800-SUICIDE)    3817-3329 [employer]  All rights reserved.  This information is not intended as a substitute for professional medical care.  Always follow up with your healthcare professional's instructions.

## 2023-03-06 NOTE — NURSING
"Told in charge report that pt did not want to see baby.      0800 - Upon receiving pt, baby and adoptive parents were all in room and was told it was an "open adoption".  Pt mentioned to RN that she was told the baby had to stay with her until "legal paperwork was signed".  Adoptive mom was given baby armband by the pt.  Called SW and left a message for pt to be seen as soon as she could.  Spoke with director and was told that baby can go to adoptive parent's room as long as one has an armband.  Pt and adoptive parents updated - pt ready for baby to be moved and adoptive parents were fine with decision.  Baby moved to room in with adoptive parents in room 349.  Notified baby's RN on where baby is located; made sure baby has HUGS tag on.  SW able to see them all after this was done.      Pt requested to go home today but wanted to nap.  Told her to call RN if needs anything.  Dr. Coronel, on call MD, said she wants pt to stay at least 12 hours, will have CBC redrawn around 1530.  Will update pt on POC.        "

## 2023-03-06 NOTE — PLAN OF CARE
POC reviewed with pt around 0800; verbalized acceptance and understanding.  Pt's VS stable.  Remains free from falls and injury.  Pain controlled with ordered meds.  Placing baby up for adoption.  Adoptive parents banded and caring for baby in separate room.  Pt requesting to leave today.  CBC collected and Dr. Coronel checked prior to allowing pt to leave.  Bleeding minimal - similar to period.      Discharge instructions given to patient verbally and in writing at 1430. Verbalized understanding. Received Mother-Baby care guide during hospital stay. Prescriptions sent to pt's pharmacy with explanation for use. Verbalized understanding. CDC vaccine information statement given and risk/benifits of vaccine discussed. MMR and flu vaccines refused per pt. request. To be d/c'd to home via wheelchair once done signing papers with  and ride arrives.

## 2023-03-06 NOTE — ANESTHESIA PREPROCEDURE EVALUATION
2023  Greer Landry is a 37 y.o., female.      Pre-op Assessment    I have reviewed the Patient Summary Reports.     I have reviewed the Nursing Notes.       Review of Systems  Social:  Smoker    Hematology/Oncology:  Hematology Normal   Oncology Normal     EENT/Dental:EENT/Dental Normal   Cardiovascular:   Hypertension    Renal/:  Renal/ Normal     Hepatic/GI:  Hepatic/GI Normal    Musculoskeletal:  Musculoskeletal Normal    OB/GYN/PEDS:  Planned Vaginal Delivery   7+  , Para 3  Neuraxial Anesthesia - Previous History of complicated course , wet tap , probable (describes PDPHA)    Neurological:  Neurology Normal    Endocrine:  Endocrine Normal    Dermatological:  Skin Normal    Psych:   Psychiatric History          Physical Exam  General: Well nourished, Cooperative, Alert and Oriented    Airway:  Mallampati: II   Mouth Opening: Normal  TM Distance: Normal  Tongue: Normal  Neck ROM: Normal ROM    Dental:  Intact        Anesthesia Plan  Type of Anesthesia, risks & benefits discussed:    Anesthesia Type: Gen ETT, Epidural, Spinal  Intra-op Monitoring Plan: Standard ASA Monitors  Post Op Pain Control Plan: multimodal analgesia  Informed Consent: Informed consent signed with the Patient and all parties understand the risks and agree with anesthesia plan.  All questions answered.   ASA Score: 2  Day of Surgery Review of History & Physical: H&P Update referred to the surgeon/provider.    Ready For Surgery From Anesthesia Perspective.     .

## 2023-03-06 NOTE — ANESTHESIA PROCEDURE NOTES
Epidural    Patient location during procedure: OB   Reason for block: primary anesthetic   Reason for block: labor analgesia requested by patient and obstetrician  Diagnosis: Labor epidural   Start time: 3/6/2023 1:20 AM  Timeout: 3/6/2023 1:20 AM  End time: 3/6/2023 1:25 AM    Staffing  Performing Provider: Matthew Strickland CRNA  Authorizing Provider: Christiano Mendoza MD        Preanesthetic Checklist  Completed: patient identified, IV checked, site marked, risks and benefits discussed, surgical consent, monitors and equipment checked, pre-op evaluation, timeout performed, anesthesia consent given, hand hygiene performed and patient being monitored  Preparation  Patient position: sitting  Prep: ChloraPrep  Patient monitoring: Pulse Ox and Blood Pressure  Reason for block: primary anesthetic   Epidural  Skin Anesthetic: lidocaine 1%  Administration type: continuous  Approach: midline  Interspace: L3-4    Injection technique: SHANELLE saline  Needle and Epidural Catheter  Needle type: Tuohy   Needle gauge: 17  Needle length: 3.5 inches  Needle insertion depth: 8 cm  Catheter type: multi-orifice  Catheter size: 19 G  Catheter at skin depth: 14 cm  Insertion Attempts: 1  Test dose: 3 mL of lidocaine 1.5% with Epi 1-to-200,000  Additional Documentation: incremental injection, no paresthesia on injection, no significant pain on injection, negative aspiration for heme and CSF, no signs/symptoms of IV or SA injection and no significant complaints from patient  Needle localization: anatomical landmarks  Assessment  Ease of block: easy  Patient's tolerance of the procedure: comfortable throughout block and no complaints No inadvertent dural puncture with Tuohy.  Dural puncture not performed with spinal needle

## 2023-03-06 NOTE — NURSING
Patient placed in wheelchair and rolled to room 307.  Patient denies any pain at this time.  Bedside report given to Joycelyn Dubois RN on mother baby.

## 2023-03-06 NOTE — TELEPHONE ENCOUNTER
----- Message from Vilma Coronel MD sent at 3/6/2023 11:16 AM CST -----  Regarding: schedule post partum visit  This patient will be going home today from hospital, please schedule for post partum visit in 6 weeks with Dr. Nice thanks!

## 2023-03-06 NOTE — PLAN OF CARE
Note copied from Infant's chart (MRN: [id number])     Sw met with pt's adoptive parents Sylvia and Hanane Hanna in room K349. Pt's adoptive parents were easily engaged and education on the role of  was provided. Pt's adoptive parents stated they have chosen the name Fuentes for pt. Pt's adoptive parents reported they have not chosen a pediatrician yet for pt but will choose one shortly as they are between two pediatricians. Pt's adoptive parents were encouraged to call with any questions or concerns. Pt's adoptive parents verbalize understanding.      Sw made copy of pt's adoptive parents ID and played them in pt's folder.      Sw will continue to follow.

## 2023-03-06 NOTE — L&D DELIVERY NOTE
Operative Note    Date: 3/6/2023  Time: 3:52 AM  Preoperative Diagnosis: IUP @ 39.2 wks, labor  Postoperative Diagnosis: same  Procedure: s/p   Type of Anesthesia: epidural  Surgeon: silvia braden MD  specimen/Tissue Removed: intact 3 vessel cord placenta  Estimated Blood Loss: 350cc  Complications: none  Findings: viable female infant in cephalic presentation with APGARS 9 and 9; Head delivered MELITON;  cord doubly clamped then cut by dr braden.   handed to awaiting peds and taken out of the room for evaluation and patient did not desire to see the baby.     Intact 3 vessel cord placenta delivered with gentle manual traction. Uterine fundus massaged and noted to be firm.  Perineum evaluated and no tear appreciated. IV pitocin with rectal cytotec and IM methergine given for postpartum hemorrhage hemophylaxis given patient's gravity.  Hemostasis achieved. Mother and  baby healthy, doing well.    VINCENT Braden MD

## 2023-03-06 NOTE — DISCHARGE SUMMARY
Obstetrical Discharge Summary    Admission Date: 3/5/2023   Discharge Date: 3/6/42335/2023  Admitting Diagnosis: Uterine contractions during pregnancy [O47.9] Intrauterine pregnancy 39w2d  Discharge Diagnosis: Vaginal, Spontaneous     Procedure:   Consults: none     Delivery date/time: 3/6/2023  @ 3:40 am   Delivery type: Vaginal, Spontaneous   Delivery Anesthesia: Epidural   Episiotomy:Episiotomy: None   Laceration type: None    Infant sex: female   Infant birthweight: 3.26 kg (7 lb 3 oz)     Apgars:  1 minute: 9    5 minute: 9    10 minute:    15 minute:                       Hospital Course: Patient was admitted to L&D for labor. She had a uncomplicated vaginal delivery of a viable female infant weighing 3.26 kg (7 lb 3 oz) .  She was transferred to [facility] baby in stable condition.  Her recovery was uncomplicated and by  PPD0  she was tolerating PO without N/V, ambulating without difficulty, her pain was well controlled with PO pain medications and she had passed flatus. Patient requested discharge home. She desired depo for birth control. She was stable and ready for discharge.       Pertinent studies:  Postpartum CBC  Lab Results   Component Value Date    WBC 15.08 (H) 2023    HGB 8.5 (L) 2023    HCT 26.8 (L) 2023    MCV 79 (L) 2023     2023       Vital signs at discharge:  /61 (BP Location: Left arm, Patient Position: Lying)   Pulse 96   Temp 98.4 °F (36.9 °C) (Oral)   Resp 18   SpO2 97%   Breastfeeding No     Rh Immune Globulin Given(O POS): N/A  Rubella Vaccine Given: declined   Tdap Vaccine Given: not given, late prenatal care initiation   Contraception: DepoProvera      Patient Instructions:   Discharge Medication List as of 3/6/2023  4:52 PM        START taking these medications    Details   ibuprofen (ADVIL,MOTRIN) 600 MG tablet Take 1 tablet (600 mg total) by mouth every 6 (six) hours., Starting Mon 3/6/2023, Normal           CONTINUE these  medications which have NOT CHANGED    Details   butalbital-acetaminophen-caffeine -40 mg (FIORICET, ESGIC) -40 mg per tablet Take 1 tablet by mouth every 4 (four) hours as needed for Pain., Starting Mon 2/20/2023, Until Wed 3/22/2023 at 2359, Normal      PNV no.106-iron-folate #6-dha (OB COMPLETE GOLD) 27.5 mg iron- 1 mg Cap Take 27.5 mg by mouth once daily., Starting 9/23/2016, Until Discontinued, Normal           STOP taking these medications       HYDROcodone-acetaminophen (NORCO) 5-325 mg per tablet Comments:   Reason for Stopping:               Discharge Procedure Orders   Diet Adult Regular     Pelvic Rest     Notify your health care provider if you experience any of the following:  temperature >100.4     Notify your health care provider if you experience any of the following:  persistent nausea and vomiting or diarrhea     Notify your health care provider if you experience any of the following:  severe uncontrolled pain     Notify your health care provider if you experience any of the following:  persistent dizziness, light-headedness, or visual disturbances     Notify your health care provider if you experience any of the following:  severe persistent headache     Reason for not Prescribing Nicotine Replacement     Order Specific Question Answer Comments   Reason for not Prescribing: Not medically appropriate at this time      Reason for not Ordering Smoking Cessation Referral     Order Specific Question Answer Comments   Reason for not ordering: Not medically appropriate at this time      Activity as tolerated           Disposition: home   Condition: stable for discharge  Follow up: 6 week post partum visit      Preprinted discharge instructions given to patient.            Vilma Coronel MD

## 2023-03-06 NOTE — H&P
HPI:   Greer Landry is a 37 y.o. female  at 39.3 weeks gestation who presents here for active labor. Patient will put  up for adoptions.    ROS:  GENERAL: Denies weight gain or weight loss. Feeling well overall.   SKIN: Denies rash or lesions.   HEAD: Denies head injury or headache.   CHEST: Denies chest pain or shortness of breath.   CARDIOVASCULAR: Denies palpitations or left sided chest pain.   ABDOMEN: ctxs  URINARY: No frequency, dysuria, hematuria, or burning on urination.  REPRODUCTIVE: See HPI.     Past Medical History:   Diagnosis Date    ADD (attention deficit disorder)     Chronic hypertension     no meds- states anxiety induced     History reviewed. No pertinent surgical history.  Family History   Problem Relation Age of Onset    Cancer Paternal Grandfather     Dementia Paternal Grandmother     No Known Problems Maternal Grandmother     No Known Problems Maternal Grandfather     Hypertension Father     Alcohol abuse Father     Heart attack Mother     Alcohol abuse Mother     No Known Problems Sister     Breast cancer Neg Hx     Colon cancer Neg Hx     Ovarian cancer Neg Hx      Allergies: Patient has no known allergies.       PE:   Pulse:  [76-93]   BP: (129-154)/(65-91)   APPEARANCE: Well nourished, well developed, in no acute distress.  ABDOMEN:  Gravid.   SVE per RN: 6cm on admission  FHT 130bpm, mod sergio, +accels, no decels  [place]: q3-4 min    Lab Results   Component Value Date    WBC 13.01 (H) 2023    HGB 9.0 (L) 2023    HCT 28.2 (L) 2023    MCV 79 (L) 2023     2023       O POS    Assessment:  IUP 39.2 wks, labor    Plan:   Admit to L&D  FHT Cat 1  GBS neg   Plan: anticipate vaginal delivery  Consents reviewed    IVANNA Braden MD

## 2023-03-08 ENCOUNTER — PATIENT MESSAGE (OUTPATIENT)
Dept: OBSTETRICS AND GYNECOLOGY | Facility: CLINIC | Age: 38
End: 2023-03-08
Payer: MEDICAID

## 2023-03-09 NOTE — TELEPHONE ENCOUNTER
Pt is requesting a refill of pain meds and she would also like a refill of fioricet. Please advise

## 2023-03-10 RX ORDER — BUTALBITAL, ACETAMINOPHEN AND CAFFEINE 50; 325; 40 MG/1; MG/1; MG/1
1 TABLET ORAL EVERY 4 HOURS PRN
Qty: 30 TABLET | Refills: 0 | Status: ON HOLD | OUTPATIENT
Start: 2023-03-10 | End: 2023-04-24 | Stop reason: HOSPADM

## 2023-03-10 NOTE — TELEPHONE ENCOUNTER
Fioricet sent in  Motrin is all we prescribe for vaginal deliveries  Need pp visit and preop scheduled in 4 wks and will do BTL in 6 wks at [facility], alondrara plz schedule

## 2023-03-13 ENCOUNTER — PATIENT MESSAGE (OUTPATIENT)
Dept: OBSTETRICS AND GYNECOLOGY | Facility: CLINIC | Age: 38
End: 2023-03-13
Payer: MEDICAID

## 2023-03-13 NOTE — ANESTHESIA POSTPROCEDURE EVALUATION
Anesthesia Post Evaluation    Patient: Greer Landry    Procedure(s) Performed: * No procedures listed *    Final Anesthesia Type: epidural      Patient location during evaluation: labor & delivery  Patient participation: Yes- Able to Participate  Level of consciousness: awake and alert and oriented  Post-procedure vital signs: reviewed and stable  Pain management: adequate  Airway patency: patent    PONV status at discharge: No PONV  Anesthetic complications: no      Cardiovascular status: blood pressure returned to baseline and hemodynamically stable  Respiratory status: unassisted, room air and spontaneous ventilation  Hydration status: euvolemic  Follow-up not needed.      No case tracking events are documented in the log.      Pain/Judy Score: No data recorded

## 2023-03-14 NOTE — TELEPHONE ENCOUNTER
When is this BTL getting scheduled?  Plz schedule asap at [facility]  I have Mon am availble rigoberto may plz take advantage of this oppurtunity

## 2023-03-15 ENCOUNTER — TELEPHONE (OUTPATIENT)
Dept: OBSTETRICS AND GYNECOLOGY | Facility: CLINIC | Age: 38
End: 2023-03-15
Payer: MEDICAID

## 2023-03-15 ENCOUNTER — PATIENT MESSAGE (OUTPATIENT)
Dept: OBSTETRICS AND GYNECOLOGY | Facility: CLINIC | Age: 38
End: 2023-03-15
Payer: MEDICAID

## 2023-03-15 DIAGNOSIS — Z30.2 ENCOUNTER FOR FEMALE STERILIZATION PROCEDURE: Primary | ICD-10-CM

## 2023-03-31 VITALS
SYSTOLIC BLOOD PRESSURE: 121 MMHG | TEMPERATURE: 98 F | OXYGEN SATURATION: 97 % | DIASTOLIC BLOOD PRESSURE: 61 MMHG | HEART RATE: 96 BPM | RESPIRATION RATE: 18 BRPM

## 2023-04-11 ENCOUNTER — PATIENT MESSAGE (OUTPATIENT)
Dept: OBSTETRICS AND GYNECOLOGY | Facility: CLINIC | Age: 38
End: 2023-04-11
Payer: MEDICAID

## 2023-04-17 ENCOUNTER — PATIENT MESSAGE (OUTPATIENT)
Dept: OBSTETRICS AND GYNECOLOGY | Facility: CLINIC | Age: 38
End: 2023-04-17

## 2023-04-19 ENCOUNTER — OFFICE VISIT (OUTPATIENT)
Dept: OBSTETRICS AND GYNECOLOGY | Facility: CLINIC | Age: 38
End: 2023-04-19
Payer: MEDICAID

## 2023-04-19 VITALS
SYSTOLIC BLOOD PRESSURE: 124 MMHG | DIASTOLIC BLOOD PRESSURE: 79 MMHG | WEIGHT: 164.63 LBS | BODY MASS INDEX: 22.32 KG/M2

## 2023-04-19 DIAGNOSIS — G89.29 CHRONIC NONINTRACTABLE HEADACHE, UNSPECIFIED HEADACHE TYPE: Primary | ICD-10-CM

## 2023-04-19 DIAGNOSIS — R51.9 CHRONIC NONINTRACTABLE HEADACHE, UNSPECIFIED HEADACHE TYPE: Primary | ICD-10-CM

## 2023-04-19 DIAGNOSIS — Z30.2 STERILIZATION: ICD-10-CM

## 2023-04-19 PROCEDURE — 99999 PR PBB SHADOW E&M-EST. PATIENT-LVL III: ICD-10-PCS | Mod: PBBFAC,,, | Performed by: OBSTETRICS & GYNECOLOGY

## 2023-04-19 PROCEDURE — 3078F PR MOST RECENT DIASTOLIC BLOOD PRESSURE < 80 MM HG: ICD-10-PCS | Mod: CPTII,,, | Performed by: OBSTETRICS & GYNECOLOGY

## 2023-04-19 PROCEDURE — 99213 OFFICE O/P EST LOW 20 MIN: CPT | Mod: PBBFAC,PO | Performed by: OBSTETRICS & GYNECOLOGY

## 2023-04-19 PROCEDURE — 3078F DIAST BP <80 MM HG: CPT | Mod: CPTII,,, | Performed by: OBSTETRICS & GYNECOLOGY

## 2023-04-19 PROCEDURE — 3074F PR MOST RECENT SYSTOLIC BLOOD PRESSURE < 130 MM HG: ICD-10-PCS | Mod: CPTII,,, | Performed by: OBSTETRICS & GYNECOLOGY

## 2023-04-19 PROCEDURE — 3074F SYST BP LT 130 MM HG: CPT | Mod: CPTII,,, | Performed by: OBSTETRICS & GYNECOLOGY

## 2023-04-19 PROCEDURE — 99213 PR OFFICE/OUTPT VISIT, EST, LEVL III, 20-29 MIN: ICD-10-PCS | Mod: S$PBB,,, | Performed by: OBSTETRICS & GYNECOLOGY

## 2023-04-19 PROCEDURE — 3008F BODY MASS INDEX DOCD: CPT | Mod: CPTII,,, | Performed by: OBSTETRICS & GYNECOLOGY

## 2023-04-19 PROCEDURE — 99213 OFFICE O/P EST LOW 20 MIN: CPT | Mod: S$PBB,,, | Performed by: OBSTETRICS & GYNECOLOGY

## 2023-04-19 PROCEDURE — 99999 PR PBB SHADOW E&M-EST. PATIENT-LVL III: CPT | Mod: PBBFAC,,, | Performed by: OBSTETRICS & GYNECOLOGY

## 2023-04-19 PROCEDURE — 3008F PR BODY MASS INDEX (BMI) DOCUMENTED: ICD-10-PCS | Mod: CPTII,,, | Performed by: OBSTETRICS & GYNECOLOGY

## 2023-04-19 RX ORDER — LIDOCAINE HYDROCHLORIDE 10 MG/ML
1 INJECTION, SOLUTION EPIDURAL; INFILTRATION; INTRACAUDAL; PERINEURAL ONCE
Status: CANCELLED | OUTPATIENT
Start: 2023-04-19 | End: 2023-04-19

## 2023-04-19 RX ORDER — CEFAZOLIN SODIUM 2 G/50ML
2 SOLUTION INTRAVENOUS
Status: CANCELLED | OUTPATIENT
Start: 2023-04-19

## 2023-04-19 RX ORDER — MUPIROCIN 20 MG/G
OINTMENT TOPICAL
Status: CANCELLED | OUTPATIENT
Start: 2023-04-19

## 2023-04-19 RX ORDER — ONDANSETRON 8 MG/1
8 TABLET, ORALLY DISINTEGRATING ORAL EVERY 8 HOURS PRN
Status: CANCELLED | OUTPATIENT
Start: 2023-04-19

## 2023-04-19 RX ORDER — SUMATRIPTAN 50 MG/1
50 TABLET, FILM COATED ORAL ONCE
Qty: 20 TABLET | Refills: 1 | Status: SHIPPED | OUTPATIENT
Start: 2023-04-19 | End: 2023-04-19

## 2023-04-19 NOTE — PROGRESS NOTES
CC:  Chief Complaint   Patient presents with    Postpartum Care    Pre-op Exam       HPI:    37 y.o.   OB History          10    Para   7    Term   7       0    AB   3    Living   7         SAB   1    IAB   2    Ectopic   0    Multiple   0    Live Births   6               Complaining of:   Recurrent HA's and has been requesting rpt rx's for fioricet  FP referral placed to get est with PCP at [facility]  Discussed BTL and plqans again  (Not in a hospital admission)      Review of patient's allergies indicates:  No Known Allergies     Past Medical History:   Diagnosis Date    ADD (attention deficit disorder)     Chronic hypertension     no meds- states anxiety induced     History reviewed. No pertinent surgical history.  Family History   Problem Relation Age of Onset    Cancer Paternal Grandfather     Dementia Paternal Grandmother     No Known Problems Maternal Grandmother     No Known Problems Maternal Grandfather     Hypertension Father     Alcohol abuse Father     Heart attack Mother     Alcohol abuse Mother     No Known Problems Sister     Breast cancer Neg Hx     Colon cancer Neg Hx     Ovarian cancer Neg Hx      Social History     Tobacco Use    Smoking status: Every Day     Packs/day: 1.00     Years: 24.00     Pack years: 24.00     Types: Cigarettes     Start date:     Smokeless tobacco: Current    Tobacco comments:     Pt states decreased to only a few cigarettes per day when she learned that she was pregnant. She declines referral to Ambulatory Smoking Cessation clinic at this time. Handout provided.   Substance Use Topics    Alcohol use: No    Drug use: Not Currently     Types: Marijuana     ROS:  GENERAL: Feeling well overall. Denies fever or chills.   SKIN: Denies rash or lesions.   HEAD: Denies head injury or headache.   NODES: Denies enlarged lymph nodes.   CHEST: Denies chest pain or shortness of breath.   CARDIOVASCULAR: Denies palpitations or left sided chest pain.    ABDOMEN:  Denies diarrhea, nausea, vomiting or rectal bleeding.   URINARY: No dysuria, hematuria, or burning on urination.  REPRODUCTIVE: See HPI.   BREASTS: Denies pain, lumps, or nipple discharge.   HEMATOLOGIC: No easy bruisability or excessive bleeding.   MUSCULOSKELETAL: Denies joint pain or swelling.   NEUROLOGIC: Denies syncope or weakness.   PSYCHIATRIC: Denies depression, anxiety or mood swings.      PE: /79   Wt 74.7 kg (164 lb 9.6 oz)   LMP 04/17/2023   Breastfeeding No   BMI 22.32 kg/m²      APPEARANCE: Well nourished, well developed, in no acute distress.  SKIN: Normal skin turgor, no lesions.  NECK: Neck symmetric without masses or thyromegaly.  NODES: No inguinal, cervical, axillary or femoral lymph node enlargement.  CARDIOVASCULAR: Normal S1, S2. No rubs, murmurs or gallops.  NEUROLOGIC: Normal mood and affect. No depression or anxiety.   ABDOMEN: Soft. No tenderness or masses. No hepatosplenomegaly. No hernias.  RESPIRATORY: Normal respiratory effort with no retractions or use of accessory muscles.      ASSESSMENT/ PLAN    Greer was seen today for postpartum care and pre-op exam.    Diagnoses and all orders for this visit:    Chronic nonintractable headache, unspecified headache type  -     Ambulatory referral/consult to Neurology; Future    Other orders  -     sumatriptan (IMITREX) 50 MG tablet; Take 1 tablet (50 mg total) by mouth once. May rpt dose once per 24hrs if 1st dose doesn't work for 1 dose      F/u with PCP and neuro for HA mgmt  Lap BTL monday      Mynor Nice MD

## 2023-04-24 PROBLEM — Z30.2 ENCOUNTER FOR FEMALE STERILIZATION PROCEDURE: Status: ACTIVE | Noted: 2023-04-24

## 2023-05-31 ENCOUNTER — TELEPHONE (OUTPATIENT)
Dept: OBSTETRICS AND GYNECOLOGY | Facility: CLINIC | Age: 38
End: 2023-05-31
Payer: MEDICAID

## 2024-01-31 ENCOUNTER — PATIENT MESSAGE (OUTPATIENT)
Dept: OBSTETRICS AND GYNECOLOGY | Facility: CLINIC | Age: 39
End: 2024-01-31
Payer: MEDICAID

## 2024-09-20 NOTE — MR AVS SNAPSHOT
[facility] [address]  [address]  [address]  [facility] [address]  Phone: [phone]  Fax: [phone]                  Greer Landry   3/9/2017 9:30 AM   Routine Prenatal    Description:  Female : 1985   Provider:  Bhavya Espinoza MD   Department:  [facility] [address]           Reason for Visit     Routine Prenatal Visit                To Do List           Future Appointments        Provider Department Dept Phone    3/14/2017 9:30 AM Bhavya Espinoza MD [facility] [address] [phone]      Goals (5 Years of Data)     None      [facility]     [facility] -  Assistance  Registered nurses in the [facility] provide clinical advisement, health education, appointment booking, and other advisory services.  Call for this free service at [phone].             Medications           Message regarding Medications     Verify the changes and/or additions to your medication regime listed below are the same as discussed with your clinician today.  If any of these changes or additions are incorrect, please notify your healthcare provider.             Verify that the below list of medications is an accurate representation of the medications you are currently taking.  If none reported, the list may be blank. If incorrect, please contact your healthcare provider. Carry this list with you in case of emergency.           Current Medications     butalbital-acetaminophen-caffeine -40 mg (FIORICET, ESGIC) -40 mg per tablet Take 1 tablet by mouth every 4 (four) hours as needed for Pain.    dextroamphetamine-amphetamine (ADDERALL) 20 mg tablet Take 1 tablet by mouth 2 (two) times daily.    ondansetron (ZOFRAN, AS HYDROCHLORIDE,) 8 MG tablet Take 1 tablet (8 mg total) by mouth every 12 (twelve) hours as needed for Nausea.    PNV no.106-iron-folate #6-dha (OB COMPLETE GOLD) 27.5 mg iron- 1 mg Cap Take 27.5 mg by mouth once  ----- Message from Reg Palomares DO sent at 9/20/2024  6:03 AM EDT -----  Call pt labs are acceptable.   daily.    dextroamphetamine-amphetamine (ADDERALL) 20 mg tablet Take 1 tablet by mouth 2 (two) times daily.           Clinical Reference Information           Prenatal Vitals     Enc. Date GA Prenatal Vitals Prenatal Pulse Pain Level Urine Albumin/Glucose Edema Presentation Dilation/Effacement/Station    3/9/17 36w0d 102/70 / 81.8 kg (180 lb 5.4 oz)    Negative / Negative       17 31w5d 100/70 / 75.3 kg (166 lb 0.1 oz)  /  / Present  0 Negative / Negative None / None      17 29w5d 100/72 / 76 kg (167 lb 8.8 oz) 28 cm / 145 / Present  0 Trace / Negative None / None / None / No      16 12w1d 102/64 / 71.1 kg (156 lb 12 oz)   0 Negative / Negative          TW.2 kg (42 lb 5.4 oz)   Pregravid weight: 62.6 kg (138 lb)   Number of fetuses: 1       Your Vitals Were     BP Weight Last Period BMI       102/70 81.8 kg (180 lb 5.4 oz) 2016 25.15 kg/m2       Allergies as of 3/9/2017     No Known Allergies      Immunizations Administered on Date of Encounter - 3/9/2017     None      Smoking Cessation     If you would like to quit smoking:   You may be eligible for free services if you are a [place] resident and started smoking cigarettes before 1988.  Call the Smoking Cessation [employer] ([facility]) toll free at [phone] or [phone].   Call QUIT-NOW if you do not meet the above criteria.            Language Assistance Services     ATTENTION: Language assistance services are available, free of charge. Please call [phone].      ATENCIÓN: Si habla español, tiene a canela disposición servicios gratuitos de asistencia lingüística. Llame al [phone].     CHÚ Ý: N?u b?n nói Ti?ng Vi?t, có các d?ch v? h? tr? ngôn ng? mi?n phí dành cho b?n. G?i s? [phone].         [facility] [address] complies with applicable Federal civil rights laws and does not discriminate on the basis of race, color, national origin, age, disability, or sex.

## 2025-03-25 ENCOUNTER — OFFICE VISIT (OUTPATIENT)
Dept: PRIMARY CARE CLINIC | Facility: CLINIC | Age: 40
End: 2025-03-25

## 2025-03-25 VITALS
RESPIRATION RATE: 20 BRPM | TEMPERATURE: 99 F | WEIGHT: 129 LBS | HEART RATE: 74 BPM | OXYGEN SATURATION: 99 % | HEIGHT: 70 IN | SYSTOLIC BLOOD PRESSURE: 143 MMHG | BODY MASS INDEX: 18.47 KG/M2 | DIASTOLIC BLOOD PRESSURE: 84 MMHG

## 2025-03-25 DIAGNOSIS — F41.9 ANXIETY: ICD-10-CM

## 2025-03-25 DIAGNOSIS — K08.109 COMPLETE LOSS OF TEETH: Primary | ICD-10-CM

## 2025-03-25 DIAGNOSIS — F12.20 CANNABIS USE DISORDER, MODERATE, DEPENDENCE: ICD-10-CM

## 2025-03-25 DIAGNOSIS — G47.00 INSOMNIA, UNSPECIFIED TYPE: ICD-10-CM

## 2025-03-25 DIAGNOSIS — Z76.89 ENCOUNTER TO ESTABLISH CARE: ICD-10-CM

## 2025-03-25 DIAGNOSIS — G43.109 MIGRAINE WITH AURA AND WITHOUT STATUS MIGRAINOSUS, NOT INTRACTABLE: ICD-10-CM

## 2025-03-25 PROCEDURE — 99204 OFFICE O/P NEW MOD 45 MIN: CPT | Mod: S$PBB,,,

## 2025-03-25 PROCEDURE — 99213 OFFICE O/P EST LOW 20 MIN: CPT | Mod: PBBFAC,PN

## 2025-03-25 PROCEDURE — 99999 PR PBB SHADOW E&M-EST. PATIENT-LVL III: CPT | Mod: PBBFAC,,,

## 2025-03-25 RX ORDER — BUTALBITAL, ACETAMINOPHEN AND CAFFEINE 50; 325; 40 MG/1; MG/1; MG/1
1 TABLET ORAL 2 TIMES DAILY PRN
Qty: 30 TABLET | Refills: 1 | Status: SHIPPED | OUTPATIENT
Start: 2025-03-25 | End: 2025-04-24

## 2025-03-25 RX ORDER — NAPROXEN 500 MG/1
500 TABLET ORAL 2 TIMES DAILY PRN
Qty: 60 TABLET | Refills: 1 | Status: SHIPPED | OUTPATIENT
Start: 2025-03-25 | End: 2025-05-24

## 2025-03-25 NOTE — PROGRESS NOTES
SUBJECTIVE     Chief Complaint   Patient presents with    Establish Care       HPI  Greer Landry is a 39 y.o. female with multiple medical diagnoses as listed in the medical history and problem list that presents to clinic to establish care. Pt is new to me. She does have any current stressors. Pt is UTD on age appropriate [place] screening. Dental exam is not UTD. Eye exam is UTD.     HPI    History of Present Illness    CHIEF COMPLAINT:  Patient presents today to establish primary care and obtain documentation for dental work    DENTAL HISTORY:  She has complete absence of dentition which significantly impacts her ability to eat.    HEADACHES:  She experiences migraines several times weekly with associated floaters. She has a history of pregnancy-related migraines.    MENTAL HEALTH:  She reports anxiety affecting daily activities, including inability to answer phone calls, check voicemails, attend job interviews, and complete necessary paperwork including Medicaid renewal. She uses marijuana for stress management.    SLEEP:  She reports irregular sleep patterns, obtaining 3-6 hours nightly due to co-sleeping with her three-year-old child.    MEDICAL HISTORY:  She has never established primary care, having only seen gynecologists due to multiple pregnancies. Last pap smear was performed in 2021. She was diagnosed with anemia in 2023.    MEDICATIONS:  She recently started iron supplementation and B12.      ROS:  General: -fever, -chills, -fatigue, -weight gain, -weight loss, -loss of appetite, +sleep disturbances  Eyes: -vision changes, -redness, -discharge  ENT: -ear pain, -nasal congestion, -sore throat  Cardiovascular: -chest pain, -palpitations, -lower extremity edema  Respiratory: -cough, -shortness of breath  Gastrointestinal: -abdominal pain, -nausea, -vomiting, -diarrhea, -constipation, -blood in stool, +teeth problems  Genitourinary: -dysuria, -hematuria, -frequency  Musculoskeletal: -joint pain, -muscle  "pain  Skin: -rash, -lesion  Neurological: +headache, -dizziness, -numbness, -tingling, +migraines  Psychiatric: +anxiety, -depression, -sleep difficulty, +social anxiety, +increased stressors         PAST MEDICAL HISTORY:  Past Medical History:   Diagnosis Date    ADD (attention deficit disorder) 2007    Chronic hypertension     no meds- states anxiety induced       PAST SURGICAL HISTORY:  Past Surgical History:   Procedure Laterality Date    LAPAROSCOPIC LIGATION OF FALLOPIAN TUBE Bilateral 4/24/2023    Procedure: LIGATION, FALLOPIAN TUBE, LAPAROSCOPIC;  Surgeon: Mynor Nice MD;  Location: [facility];  Service: OB/GYN;  Laterality: Bilateral;    VAGINAL DELIVERY      x7       SOCIAL HISTORY:  Social History[1]    FAMILY HISTORY:  Family History   Problem Relation Name Age of Onset    Cancer Paternal Grandfather      Dementia Paternal Grandmother      No Known Problems Maternal Grandmother      No Known Problems Maternal Grandfather      Hypertension Father      Alcohol abuse Father      Heart attack Mother      Alcohol abuse Mother      No Known Problems Sister      Breast cancer Neg Hx      Colon cancer Neg Hx      Ovarian cancer Neg Hx         ALLERGIES AND MEDICATIONS: updated and reviewed.  Review of patient's allergies indicates:  No Known Allergies  Current Medications[2]    OBJECTIVE     Physical Exam  Vitals:    03/25/25 1351   BP: (!) 143/84   Pulse: 74   Resp: 20   Temp: 98.6 °F (37 °C)    Body mass index is 18.42 kg/m².  Weight: 58.5 kg (128 lb 15.5 oz)   Height: 5' 10.16" (178.2 cm)     Physical Exam  Vitals reviewed.   Constitutional:       General: She is awake. She is not in acute distress.     Appearance: Normal appearance.   HENT:      Right Ear: External ear normal.      Left Ear: External ear normal.      Nose: Nose normal.      Mouth/Throat:      Mouth: Mucous membranes are moist.   Eyes:      Extraocular Movements: Extraocular movements intact.      Conjunctiva/sclera: Conjunctivae normal. "      Pupils: Pupils are equal, round, and reactive to light.   Pulmonary:      Effort: Pulmonary effort is normal.   Abdominal:      General: There is no distension.      Palpations: Abdomen is soft.   Musculoskeletal:         General: No swelling. Normal range of motion.      Cervical back: Normal range of motion.   Skin:     General: Skin is warm and dry.      Findings: No rash.   Neurological:      General: No focal deficit present.      Mental Status: She is alert and oriented to person, place, and time.   Psychiatric:         Mood and Affect: Mood is anxious.         Behavior: Behavior normal. Behavior is cooperative.       Health Maintenance         Date Due Completion Date    Lipid Panel Never done ---    Cervical Cancer Screening 09/28/2024 9/28/2021    Influenza Vaccine (1) 06/30/2025 (Originally 9/1/2024) ---    COVID-19 Vaccine (1 - 2024-25 season) 03/25/2026 (Originally 9/1/2024) ---    Pneumococcal Vaccines (Age 0-49) (1 of 2 - PCV) 03/25/2026 (Originally 5/29/2006) ---    TETANUS VACCINE 01/22/2031 1/22/2021    RSV Vaccine (Age 60+ and Pregnant patients) (1 - 1-dose 75+ series) 05/29/2062 ---            ASSESSMENT     39 y.o. female with     1. Complete loss of teeth    2. Cannabis use disorder, moderate, dependence    3. Migraine with aura and without status migrainosus, not intractable    4. Anxiety    5. Insomnia, unspecified type    6. Encounter to establish care        PLAN:     1. Complete loss of teeth  Chronic. Completed paperwork for assistance with dental procedure.     2. Cannabis use disorder, moderate, dependence  Ongoing. Addressing.     3. Migraine with aura and without status migrainosus, not intractable  *Chronic. Addressing. Refilled Fioricet. Order naproxen prn HA. Counseled patient on keeping headache log and bring to next visit.   - butalbital-acetaminophen-caffeine -40 mg (FIORICET, ESGIC) -40 mg per tablet; Take 1 tablet by mouth 2 (two) times daily as needed for  Headaches.  Dispense: 30 tablet; Refill: 1  - naproxen (NAPROSYN) 500 MG tablet; Take 1 tablet (500 mg total) by mouth 2 (two) times daily as needed (Headaches).  Dispense: 60 tablet; Refill: 1    4. Anxiety  Chronic. Addressing.   Encouraged the patient to perform self-calming techniques, such as deep breathing/relaxation techniques and exercise.     5. Insomnia, unspecified type  Ongoing. Addressing.   - Counseled patient on sleep hygiene measures.     6. Encounter to establish care  Patient wants to wait to complete annual labs.       Assessment & Plan    IMPRESSION:  - Established care for new patient with multiple health concerns.  - Identified potential anxiety symptoms based on reported behaviors.  - Documented missing teeth and potential infection risk.  - Evaluated reported frequent headaches, considering medication options.    COMPLETE LOSS OF TEETH:  - Assessed the patient's condition as a health issue affecting eating ability.  - Acknowledged the risk of dental infection due to complete loss of teeth.  - Noted that the patient has no teeth.  - Supported the patient's plan to seek treatment through a non-profit organization for dental restoration.    MIGRAINE WITH AURA:  - Noted that the patient experiences migraines a couple times per week, sometimes with visual aura (floaters).  - Acknowledged the patient's history of migraines during pregnancy.  - Evaluated the varying severity of migraines, which have been ongoing for an extended period.  - Prescribed naproxen 500 mg twice daily as needed for headaches.  - Provided a prescription for Fioricet with refills for additional headache management.    ANXIETY DISORDER:  - Noted that the patient reports symptoms of anxiety including avoidance of phone calls, not checking voicemails, and missing job interviews.  - Acknowledged that the patient feels under significant stress.    CANNABIS USE:  - Discussed the patient's use of marijuana as a coping mechanism for  stress.  - Noted that the patient reports using marijuana to cope with stress.  - Discussed potential risks and benefits of marijuana use for stress management.    INSOMNIA:  - Noted that the patient reports inconsistent sleep patterns, ranging from 3 to 6 hours per night.  - Discussed the importance of consistent sleep hygiene practices.    FOLLOW-UP:  - Scheduled follow up in 4 weeks for annual labs and further evaluation.         Ava Major, MSN, APRN, FNP-C  [facility]  03/25/2025 1:21 PM    I spent a total of 45 minutes on the day of the visit.This includes face to face time and non-face to face time preparing to see the patient (eg, review of tests), obtaining and/or reviewing separately obtained history, documenting clinical information in the electronic or other health record, independently interpreting results and communicating results to the patient/family/caregiver, or care coordinator.     This note was generated with the assistance of ambient listening technology. Verbal consent was obtained by the patient and accompanying visitor(s) for the recording of patient appointment to facilitate this note. I attest to having reviewed and edited the generated note for accuracy, though some syntax or spelling errors may persist. Please contact the author of this note for any clarification.        Follow up in about 4 weeks (around 4/22/2025) for Dr. Azael Sena for annual exam.         [1]   Social History  Socioeconomic History    Marital status: Single   Tobacco Use    Smoking status: Every Day     Current packs/day: 1.00     Average packs/day: 1 pack/day for 26.2 years (26.2 ttl pk-yrs)     Types: Cigarettes     Start date: 1999     Passive exposure: Current    Smokeless tobacco: Current    Tobacco comments:     Pt states decreased to only a few cigarettes per day when she learned that she was pregnant. She declines referral to Ambulatory Smoking Cessation clinic at this time. Handout  provided.   Substance and Sexual Activity    Alcohol use: No     Comment: rarely    Drug use: Not Currently     Types: Marijuana    Sexual activity: Not Currently     Partners: Male     Birth control/protection: None   [2]   Current Outpatient Medications   Medication Sig Dispense Refill    butalbital-acetaminophen-caffeine -40 mg (FIORICET, ESGIC) -40 mg per tablet Take 1 tablet by mouth 2 (two) times daily as needed for Headaches. 30 tablet 1    naproxen (NAPROSYN) 500 MG tablet Take 1 tablet (500 mg total) by mouth 2 (two) times daily as needed (Headaches). 60 tablet 1     No current facility-administered medications for this visit.